# Patient Record
Sex: MALE | Race: WHITE | NOT HISPANIC OR LATINO | Employment: OTHER | URBAN - NONMETROPOLITAN AREA
[De-identification: names, ages, dates, MRNs, and addresses within clinical notes are randomized per-mention and may not be internally consistent; named-entity substitution may affect disease eponyms.]

---

## 2017-02-07 DIAGNOSIS — R03.0 ELEVATED BLOOD PRESSURE READING WITHOUT DIAGNOSIS OF HYPERTENSION: ICD-10-CM

## 2017-02-07 DIAGNOSIS — E78.00 PURE HYPERCHOLESTEROLEMIA: ICD-10-CM

## 2017-02-07 DIAGNOSIS — Z80.42 FAMILY HISTORY OF MALIGNANT NEOPLASM OF PROSTATE: ICD-10-CM

## 2017-02-07 DIAGNOSIS — R97.20 ELEVATED PROSTATE SPECIFIC ANTIGEN (PSA): ICD-10-CM

## 2017-02-07 LAB
ALBUMIN SERPL-MCNC: 4.4 G/DL (ref 3.4–5)
ALP SERPL-CCNC: 67 U/L (ref 40–150)
ALT SERPL W P-5'-P-CCNC: 26 U/L (ref 0–70)
ANION GAP SERPL CALCULATED.3IONS-SCNC: 9 MMOL/L (ref 3–14)
AST SERPL W P-5'-P-CCNC: 18 U/L (ref 0–45)
BASOPHILS # BLD AUTO: 0.1 10E9/L (ref 0–0.2)
BASOPHILS NFR BLD AUTO: 0.8 %
BILIRUB SERPL-MCNC: 1.1 MG/DL (ref 0.2–1.3)
BUN SERPL-MCNC: 17 MG/DL (ref 7–30)
CALCIUM SERPL-MCNC: 9.1 MG/DL (ref 8.5–10.1)
CHLORIDE SERPL-SCNC: 103 MMOL/L (ref 94–109)
CHOLEST SERPL-MCNC: 187 MG/DL
CO2 SERPL-SCNC: 26 MMOL/L (ref 20–32)
CREAT SERPL-MCNC: 0.89 MG/DL (ref 0.66–1.25)
DIFFERENTIAL METHOD BLD: NORMAL
EOSINOPHIL # BLD AUTO: 0.1 10E9/L (ref 0–0.7)
EOSINOPHIL NFR BLD AUTO: 0.9 %
ERYTHROCYTE [DISTWIDTH] IN BLOOD BY AUTOMATED COUNT: 12.9 % (ref 10–15)
GFR SERPL CREATININE-BSD FRML MDRD: 87 ML/MIN/1.7M2
GLUCOSE SERPL-MCNC: 86 MG/DL (ref 70–99)
HCT VFR BLD AUTO: 44.4 % (ref 40–53)
HDLC SERPL-MCNC: 76 MG/DL
HGB BLD-MCNC: 14.7 G/DL (ref 13.3–17.7)
IMM GRANULOCYTES # BLD: 0 10E9/L (ref 0–0.4)
IMM GRANULOCYTES NFR BLD: 0.5 %
LDLC SERPL CALC-MCNC: 100 MG/DL
LYMPHOCYTES # BLD AUTO: 1.3 10E9/L (ref 0.8–5.3)
LYMPHOCYTES NFR BLD AUTO: 20.2 %
MCH RBC QN AUTO: 32.4 PG (ref 26.5–33)
MCHC RBC AUTO-ENTMCNC: 33.1 G/DL (ref 31.5–36.5)
MCV RBC AUTO: 98 FL (ref 78–100)
MONOCYTES # BLD AUTO: 0.6 10E9/L (ref 0–1.3)
MONOCYTES NFR BLD AUTO: 8.9 %
NEUTROPHILS # BLD AUTO: 4.6 10E9/L (ref 1.6–8.3)
NEUTROPHILS NFR BLD AUTO: 68.7 %
NONHDLC SERPL-MCNC: 111 MG/DL
NRBC # BLD AUTO: 0 10*3/UL
NRBC BLD AUTO-RTO: 0 /100
PLATELET # BLD AUTO: 192 10E9/L (ref 150–450)
POTASSIUM SERPL-SCNC: 4.3 MMOL/L (ref 3.4–5.3)
PROT SERPL-MCNC: 8.1 G/DL (ref 6.8–8.8)
PSA SERPL-ACNC: 1.93 UG/L (ref 0–4)
RBC # BLD AUTO: 4.54 10E12/L (ref 4.4–5.9)
SODIUM SERPL-SCNC: 138 MMOL/L (ref 133–144)
TRIGL SERPL-MCNC: 57 MG/DL
WBC # BLD AUTO: 6.6 10E9/L (ref 4–11)

## 2017-02-07 PROCEDURE — 80061 LIPID PANEL: CPT | Performed by: FAMILY MEDICINE

## 2017-02-07 PROCEDURE — 84153 ASSAY OF PSA TOTAL: CPT | Performed by: FAMILY MEDICINE

## 2017-02-07 PROCEDURE — 80053 COMPREHEN METABOLIC PANEL: CPT | Performed by: FAMILY MEDICINE

## 2017-02-07 PROCEDURE — 85025 COMPLETE CBC W/AUTO DIFF WBC: CPT | Performed by: FAMILY MEDICINE

## 2017-02-07 PROCEDURE — 36415 COLL VENOUS BLD VENIPUNCTURE: CPT | Performed by: FAMILY MEDICINE

## 2017-02-09 ENCOUNTER — OFFICE VISIT (OUTPATIENT)
Dept: FAMILY MEDICINE | Facility: OTHER | Age: 62
End: 2017-02-09
Attending: FAMILY MEDICINE
Payer: COMMERCIAL

## 2017-02-09 VITALS
SYSTOLIC BLOOD PRESSURE: 120 MMHG | HEIGHT: 71 IN | DIASTOLIC BLOOD PRESSURE: 78 MMHG | HEART RATE: 88 BPM | BODY MASS INDEX: 25.48 KG/M2 | TEMPERATURE: 97.4 F | WEIGHT: 182 LBS

## 2017-02-09 DIAGNOSIS — D23.9 ATYPICAL MOLE SYNDROME: ICD-10-CM

## 2017-02-09 DIAGNOSIS — Z71.89 ACP (ADVANCE CARE PLANNING): Primary | Chronic | ICD-10-CM

## 2017-02-09 DIAGNOSIS — R97.20 ELEVATED PROSTATE SPECIFIC ANTIGEN (PSA): ICD-10-CM

## 2017-02-09 DIAGNOSIS — F41.8 SITUATIONAL ANXIETY: ICD-10-CM

## 2017-02-09 DIAGNOSIS — Z00.00 ROUTINE GENERAL MEDICAL EXAMINATION AT A HEALTH CARE FACILITY: ICD-10-CM

## 2017-02-09 DIAGNOSIS — N52.8 OTHER MALE ERECTILE DYSFUNCTION: ICD-10-CM

## 2017-02-09 PROCEDURE — 99396 PREV VISIT EST AGE 40-64: CPT | Performed by: FAMILY MEDICINE

## 2017-02-09 RX ORDER — SILDENAFIL CITRATE 20 MG/1
TABLET ORAL
Qty: 60 TABLET | Refills: 3 | Status: SHIPPED | OUTPATIENT
Start: 2017-02-09 | End: 2017-02-14

## 2017-02-09 RX ORDER — LORAZEPAM 1 MG/1
1 TABLET ORAL EVERY 8 HOURS PRN
Qty: 20 TABLET | Refills: 0 | Status: SHIPPED | OUTPATIENT
Start: 2017-02-09 | End: 2018-02-07

## 2017-02-09 ASSESSMENT — ANXIETY QUESTIONNAIRES
6. BECOMING EASILY ANNOYED OR IRRITABLE: NOT AT ALL
GAD7 TOTAL SCORE: 2
3. WORRYING TOO MUCH ABOUT DIFFERENT THINGS: SEVERAL DAYS
1. FEELING NERVOUS, ANXIOUS, OR ON EDGE: SEVERAL DAYS
5. BEING SO RESTLESS THAT IT IS HARD TO SIT STILL: NOT AT ALL
7. FEELING AFRAID AS IF SOMETHING AWFUL MIGHT HAPPEN: NOT AT ALL
IF YOU CHECKED OFF ANY PROBLEMS ON THIS QUESTIONNAIRE, HOW DIFFICULT HAVE THESE PROBLEMS MADE IT FOR YOU TO DO YOUR WORK, TAKE CARE OF THINGS AT HOME, OR GET ALONG WITH OTHER PEOPLE: NOT DIFFICULT AT ALL
2. NOT BEING ABLE TO STOP OR CONTROL WORRYING: NOT AT ALL

## 2017-02-09 ASSESSMENT — PAIN SCALES - GENERAL: PAINLEVEL: NO PAIN (0)

## 2017-02-09 ASSESSMENT — PATIENT HEALTH QUESTIONNAIRE - PHQ9: 5. POOR APPETITE OR OVEREATING: NOT AT ALL

## 2017-02-09 NOTE — PROGRESS NOTES
Zeus is a 61 year old   Chief Complaint   Patient presents with     Physical       PMHx:  Past Medical History   Diagnosis Date     Impaired fasting glucose 2/7/2011     hypercholesterolemia 2/7/2011     Family history of colonic polyps 2/7/2011     Family history of malignant neoplasm of prostate 2/7/2011     Elevated BP/Pulse -white coat syndrome. 2/6/2012     Other male erectile dysfunction 2/8/2016     Past Surgical History   Procedure Laterality Date     Hernia repair  1970     Hernia, inguinal right     Colonoscopy  2005     repeat 2015     Colonoscopy N/A 4/9/2015     Repeat in 2025//Procedure: COLONOSCOPY;  Surgeon: Shan Nice MD;  Location: [facility]     These were reviewed with the patient/family.    MEDICATIONS were reviewed and are as follows:   Current Outpatient Prescriptions   Medication Sig Dispense Refill     LORazepam (ATIVAN) 1 MG tablet Take 1 tablet (1 mg) by mouth every 8 hours as needed for anxiety 20 tablet 0     sildenafil (VIAGRA) 100 MG tablet Take 0.5-1 tablets ( mg) by mouth daily as needed for erectile dysfunction Take 30 min to 4 hours before intercourse.  Never use with nitroglycerin, terazosin or doxazosin. 6 tablet 11     ammonium lactate (LAC-HYDRIN) 12 % lotion Apply topically 2 times daily as needed for dry skin 360 g 3     terbinafine (LAMISIL AT) 1 % cream Apply topically 2 times daily For fungal infection not resolved with other antifungals (e.g. Clotrimazole) 30 g 1     EPINEPHrine (EPIPEN) 0.3 MG/0.3ML injection Inject 0.3 mg into the muscle once as needed. For anaphylaxis         ALLERGIES:  Bactrim    Family History   Problem Relation Age of Onset     CANCER Father      pancreatic      Social History   Substance Use Topics     Smoking status: Never Smoker      Smokeless tobacco: Never Used     Alcohol Use: Yes      Comment: rarely        Review Of Systems  Skin: negative for, rash, lumps or bumps, hair changes  Eyes: negative for any visual  "changes  Ears/Nose/Throat: negative for, nasal congestion, postnasal drainage, hearing loss, vertigo  Respiratory: No shortness of breath, dyspnea on exertion, cough, or hemoptysis  Cardiovascular: negative for, palpitations, tachycardia, chest pain, exertional chest pain or pressure, dyspnea on exertion, lower extremity edema and exercise intolerance  Gastrointestinal: negative for abdominal pain, change in bowel habits, blood in stools.  Genitourinary: negative for dysuria, hematuria, incontinence and sexually transmitted disease. Does have some nocturia  Musculoskeletal: negative for, joint pain, joint swelling and muscular weakness  Neurologic: negative for, headaches, syncope, local weakness, numbness or tingling of hands, numbness or tingling of feet, speech problems and memory problems  Psychiatric: negative for, excessive stress, sleep disturbance, anxiety, depression and excessive alcohol consumption  Hematologic/Lymphatic/Immunologic: negative for, chills, fever and night sweats  Endocrine: negative for, heat intolerance, night sweats, polydipsia and polyuria    IMMUNIZATIONS reviewed.      Constitutional: healthy, alert and no distress  Blood pressure 120/78, pulse 88, temperature 97.4  F (36.3  C), height 5' 10.5\" (1.791 m), weight 182 lb (82.555 kg).  Head: Normocephalic. No masses, lesions, tenderness or abnormalities  Neck: Neck supple. No adenopathy. Thyroid symmetric, normal size,, Carotids without bruits.  ENT: ENT exam normal, no neck nodes or sinus tenderness  Cardiovascular: negative, PMI normal. No lifts, heaves, or thrills. RRR. No murmurs, clicks gallops or rub  Respiratory: negative, Percussion normal. Good diaphragmatic excursion. Lungs clear  breasts symmetric  Gastrointestinal: Abdomen soft, non-tender. BS normal. No masses, organomegaly  : Normal external genitalia without lesions. No hernias. Prostate exam normal for age  Musculoskeletal: extremities normal- no gross deformities " "noted, gait normal and normal muscle tone  Skin: no suspicious lesions or rashes  Neurologic: negative  Psychiatric: mentation appears normal and affect normal/bright  Hematologic/Lymphatic/Immunologic: normal ant/post cervical, axillary, supraclavicular and inguinal nodes       Ref Range 2d ago  (2/7/17) 1yr ago  (2/8/16) 1yr ago  (2/11/15) 3yr ago  (2/10/14)       Cholesterol <200 mg/dL 187 174 180CM 182R, CM      Triglycerides <150 mg/dL 57 46CM 69R 27R     Comments: Fasting specimen      HDL Cholesterol >39 mg/dL 76 39 mg/dL\" class=\"rz_14\" style=\"cursor: pointer;\" onmouseover='jscript: deepika varStyle=\"underline\"; deepika bgColor=\"#D6DFE7\"; this.style.backgroundColor=bgColor; deepika children=this.getElementsByTagName(\"div\"); for(deepika child=0;child  56 40 mg/dL\" class=\"rz_14\" style=\"cursor: pointer;\" onmouseover='jscript: deepika varStyle=\"underline\"; deepika bgColor=\"#D6DFE7\"; this.style.backgroundColor=bgColor; deepika children=this.getElementsByTagName(\"div\"); for(deepika child=0;child  56R 57R      LDL Cholesterol Calculated <100 mg/dL 100 (H) 109 (H)CM 110R, CM 120R, CM     Comments: Desirable:       <100 mg/dl      Non HDL Cholesterol <130 mg/dL 111 118       [facility]          Specimen Collected: 02/07/17 11:05 AM Last Resulted: 02/07/17 11:38 AM                CM=Additional comments  R=Reference range differs from displayed range                 Comprehensive metabolic panel   Status: Final result     Visible to patient:  Not Released     Dx:  hypercholesterolemia; Elevated BP/Pul...                     Ref Range 2d ago  (2/7/17) 1yr ago  (2/8/16) 1yr ago  (2/11/15) 3yr ago  (2/10/14)      Sodium 133 - 144 mmol/L 138 140 139 134 (L)R      Potassium 3.4 - 5.3 mmol/L 4.3 4.1 4.0 4.2R      Chloride 94 - 109 mmol/L 103 107 107 101R      Carbon Dioxide 20 - 32 mmol/L 26 23 25 27R      Anion Gap 3 - 14 mmol/L 9 10 7 6.5R      Glucose 70 - 99 mg/dL 86 96 111 (H)CM 99R     Comments: Fasting specimen      Urea Nitrogen 7 " "- 30 mg/dL 17 18 17CM 19R      Creatinine 0.66 - 1.25 mg/dL 0.89 0.78 0.90 0.94R      GFR Estimate >60 mL/min/1.7m2 87 60 mL/min/1.7m2\" class=\"rz_14\" style=\"cursor: pointer;\" onmouseover='jscript: deepika varStyle=\"underline\"; deepika bgColor=\"#D6DFE7\"; this.style.backgroundColor=bgColor; deepika children=this.getElementsByTagName(\"div\"); for(deepika child=0;child  >90  Non  Amer... 60 mL/min/1.7m2\" class=\"rz_14\" style=\"cursor: pointer;\" onmouseover='jscript: deepika varStyle=\"underline\"; deepika bgColor=\"#D6DFE7\"; this.style.backgroundColor=bgColor; deepika children=this.getElementsByTagName(\"div\"); for(deepika child=0;child  86CM 60 mL/min/1.7m2\" class=\"rz_15\" style=\"cursor: pointer;\" onmouseover='jscript: deepika varStyle=\"underline\"; deepika bgColor=\"#D6DFE7\"; this.style.backgroundColor=bgColor; deepika children=this.getElementsByTagName(\"div\"); for(deepika child=0;child  82     Comments: Non  GFR Calc      GFR Estimate If Black >60 mL/min/1.7m2 >90  ... 60 mL/min/1.7m2\" class=\"rz_14\" style=\"cursor: pointer;\" onmouseover='jscript: deepika varStyle=\"underline\"; deepika bgColor=\"#D6DFE7\"; this.style.backgroundColor=bgColor; deepika children=this.getElementsByTagName(\"div\"); for(deepika child=0;child  >90  ... 60 mL/min/1.7m2\" class=\"rz_14\" style=\"cursor: pointer;\" onmouseover='jscript: deepika varStyle=\"underline\"; deepika bgColor=\"#D6DFE7\"; this.style.backgroundColor=bgColor; deepika children=this.getElementsByTagName(\"div\"); for(deepika child=0;child  >90  ... 60 mL/min/1.7m2\" class=\"rz_15\" style=\"cursor: pointer;\" onmouseover='jscript: deepika varStyle=\"underline\"; deepika bgColor=\"#D6DFE7\"; this.style.backgroundColor=bgColor; deepika children=this.getElementsByTagName(\"div\"); for(deepika child=0;child  >90     >90   African American GFR Calc         Calcium 8.5 - 10.1 mg/dL 9.1 8.6 9.2CM 9.0R      Bilirubin Total 0.2 - 1.3 mg/dL 1.1 1.0 1.1 1.1 (H)R      Albumin 3.4 - 5.0 g/dL 4.4 4.3 4.3 4.5      Protein Total 6.8 - 8.8 g/dL 8.1 7.7 " 7.9 8.2R      Alkaline Phosphatase 40 - 150 U/L 67 58 61 84R      ALT 0 - 70 U/L 26 30 27 29R      AST 0 - 45 U/L 18 22 21 19R     Resulting Agency  [facility]          Specimen Collected: 02/07/17 11:05 AM Last Resulted: 02/07/17 11:38 AM                CM=Additional comments  R=Reference range differs from displayed range                 PSA, screen   Status: Final result     Visible to patient:  Not Released     Dx:  Elevated prostate specific antigen (P...                     Ref Range 2d ago  (2/7/17) 1yr ago  (2/8/16) 1yr ago  (5/14/15) 1yr ago  (2/11/15) 3yr ago  (2/10/14)      PSA 0 - 4 ug/L 1.93 1.41 1.49 3.82 1.94     Comments: Assay Method:  Chemiluminescence using Siemens Vista analyzer     Resulting Agency  [facility]          Specimen Collected: 02/07/17 11:05 AM Last Resulted: 02/07/17 11:38 AM                            CBC with platelets and differential   Status: Final result     Visible to patient:  Not Released     Dx:  hypercholesterolemia; Elevated BP/Pul...                     Ref Range 2d ago  (2/7/17) 1yr ago  (2/8/16) 1yr ago  (2/11/15) 3yr ago  (2/10/14)      WBC 4.0 - 11.0 10e9/L 6.6 3.3 (L) 4.0 4.9      RBC Count 4.4 - 5.9 10e12/L 4.54 4.24 (L) 4.49 4.60      Hemoglobin 13.3 - 17.7 g/dL 14.7 14.0 14.6 14.8      Hematocrit 40.0 - 53.0 % 44.4 42.4 44.2 44.8      MCV 78 - 100 fl 98 100 98 97      MCH 26.5 - 33.0 pg 32.4 33.0 32.5 32.2      MCHC 31.5 - 36.5 g/dL 33.1 33.0 33.0 33.0      RDW 10.0 - 15.0 % 12.9 12.5 12.8 13.1      Platelet Count 150 - 450 10e9/L 192 204 216 189      Diff Method  Automated Method Automated Method Automated Method Automated Method      % Neutrophils % 68.7 51.8 50.5 62.6      % Lymphocytes % 20.2 34.4 35.9 25.7      % Monocytes % 8.9 12.0 11.3 10.9      % Eosinophils % 0.9 0.3 1.0 0.2      % Basophils % 0.8 1.2 1.0 0.4      % Immature Granulocytes % 0.5 0.3 0.3 0.2      Nucleated RBCs 0 /100 0 0        Absolute Neutrophil 1.6 -  8.3 10e9/L 4.6 1.7 2.0 3.0      Absolute Lymphocytes 0.8 - 5.3 10e9/L 1.3 1.1 1.4 1.3      Absolute Monocytes 0.0 - 1.3 10e9/L 0.6 0.4 0.5 0.5      Absolute Eosinophils 0.0 - 0.7 10e9/L 0.1 0.0 0.0 0.0      Absolute Basophils 0.0 - 0.2 10e9/L 0.1 0.0 0.0 0.0      Abs Immature Granulocytes 0 - 0.4 10e9/L 0.0 0.0 0.0 0.0      Absolute Nucleated RBC  0.0 0.0       [facility]          Specimen Collected: 02/07/17 11:05 AM Last Resulted: 02/07/17 11:17 AM                                    ASSESSMENT:  (Z71.89) ACP (advance care planning)  (primary encounter diagnosis)  Comment:   Plan: info given    (Z00.00) Routine general medical examination at a health care facility  Comment: very healthy habits.   Plan: see in a year     (R97.20) Elevated prostate specific antigen (PSA)  Comment: better recently   Plan: f/u in a year     (F41.8) Situational anxiety  Comment: Rf on ativan - uses 20 /yr  Plan: LORazepam (ATIVAN) 1 MG tablet            (N52.8) Other male erectile dysfunction  Comment: wants Generic- will give RF  Plan: sildenafil (REVATIO/VIAGRA) 20 MG tablet            (D22.9) Atypical mole syndrome  Comment: has had some moles removed in past- needs yearly f/u with derm.   Plan: DERMATOLOGY REFERRAL

## 2017-02-09 NOTE — MR AVS SNAPSHOT
"              After Visit Summary   2/9/2017    Zeus Ford    MRN: [id number]           Patient Information     Date Of Birth          1955        Visit Information        Provider Department      2/9/2017 10:00 AM Cirilo Landeros MD [facility]        Today's Diagnoses     ACP (advance care planning)    -  1     Routine general medical examination at a health care facility         Elevated prostate specific antigen (PSA)         Situational anxiety         Other male erectile dysfunction         Atypical mole syndrome           Care Instructions       Ref Range 2d ago  (2/7/17) 1yr ago  (2/8/16) 1yr ago  (2/11/15) 3yr ago  (2/10/14)       Cholesterol <200 mg/dL 187 174 180CM 182R, CM      Triglycerides <150 mg/dL 57 46CM 69R 27R     Comments: Fasting specimen      HDL Cholesterol >39 mg/dL 76 39 mg/dL\" class=\"rz_14\" style=\"cursor: pointer;\" onmouseover='jscript: deepika varStyle=\"underline\"; deepika bgColor=\"#D6DFE7\"; this.style.backgroundColor=bgColor; deepika children=this.getElementsByTagName(\"div\"); for(deepika child=0;child  56 40 mg/dL\" class=\"rz_14\" style=\"cursor: pointer;\" onmouseover='jscript: deepika varStyle=\"underline\"; deepika bgColor=\"#D6DFE7\"; this.style.backgroundColor=bgColor; deepika children=this.getElementsByTagName(\"div\"); for(deepika child=0;child  56R 57R      LDL Cholesterol Calculated <100 mg/dL 100 (H) 109 (H)CM 110R, CM 120R, CM     Comments: Desirable:       <100 mg/dl      Non HDL Cholesterol <130 mg/dL 111 118       [facility]          Specimen Collected: 02/07/17 11:05 AM Last Resulted: 02/07/17 11:38 AM                CM=Additional comments  R=Reference range differs from displayed range                 Comprehensive metabolic panel   Status: Final result     Visible to patient:  Not Released     Dx:  hypercholesterolemia; Elevated BP/Pul...                     Ref Range 2d ago  (2/7/17) 1yr ago  (2/8/16) 1yr ago  (2/11/15) 3yr ago  (2/10/14)      Sodium 133 - 144 mmol/L " "138 140 139 134 (L)R      Potassium 3.4 - 5.3 mmol/L 4.3 4.1 4.0 4.2R      Chloride 94 - 109 mmol/L 103 107 107 101R      Carbon Dioxide 20 - 32 mmol/L 26 23 25 27R      Anion Gap 3 - 14 mmol/L 9 10 7 6.5R      Glucose 70 - 99 mg/dL 86 96 111 (H)CM 99R     Comments: Fasting specimen      Urea Nitrogen 7 - 30 mg/dL 17 18 17CM 19R      Creatinine 0.66 - 1.25 mg/dL 0.89 0.78 0.90 0.94R      GFR Estimate >60 mL/min/1.7m2 87 60 mL/min/1.7m2\" class=\"rz_14\" style=\"cursor: pointer;\" onmouseover='jscript: deepika varStyle=\"underline\"; deepika bgColor=\"#D6DFE7\"; this.style.backgroundColor=bgColor; deepika children=this.getElementsByTagName(\"div\"); for(deepika child=0;child  >90  Non  Amer... 60 mL/min/1.7m2\" class=\"rz_14\" style=\"cursor: pointer;\" onmouseover='jscript: deepika varStyle=\"underline\"; deepika bgColor=\"#D6DFE7\"; this.style.backgroundColor=bgColor; deepika children=this.getElementsByTagName(\"div\"); for(deepika child=0;child  86CM 60 mL/min/1.7m2\" class=\"rz_15\" style=\"cursor: pointer;\" onmouseover='jscript: deepika varStyle=\"underline\"; deepika bgColor=\"#D6DFE7\"; this.style.backgroundColor=bgColor; deepika children=this.getElementsByTagName(\"div\"); for(deepika child=0;child  82     Comments: Non  GFR Calc      GFR Estimate If Black >60 mL/min/1.7m2 >90  ... 60 mL/min/1.7m2\" class=\"rz_14\" style=\"cursor: pointer;\" onmouseover='jscript: deepika varStyle=\"underline\"; deepika bgColor=\"#D6DFE7\"; this.style.backgroundColor=bgColor; deepika children=this.getElementsByTagName(\"div\"); for(deepika child=0;child  >90  ... 60 mL/min/1.7m2\" class=\"rz_14\" style=\"cursor: pointer;\" onmouseover='jscript: deepika varStyle=\"underline\"; deepika bgColor=\"#D6DFE7\"; this.style.backgroundColor=bgColor; deepika children=this.getElementsByTagName(\"div\"); for(deepika child=0;child  >90  ... 60 mL/min/1.7m2\" class=\"rz_15\" style=\"cursor: pointer;\" onmouseover='jscript: deepika varStyle=\"underline\"; deepika bgColor=\"#D6DFE7\"; this.style.backgroundColor=bgColor; " "deepika children=this.getElementsByTagName(\"div\"); for(deepika child=0;child  >90     >90   African American GFR Calc         Calcium 8.5 - 10.1 mg/dL 9.1 8.6 9.2CM 9.0R      Bilirubin Total 0.2 - 1.3 mg/dL 1.1 1.0 1.1 1.1 (H)R      Albumin 3.4 - 5.0 g/dL 4.4 4.3 4.3 4.5      Protein Total 6.8 - 8.8 g/dL 8.1 7.7 7.9 8.2R      Alkaline Phosphatase 40 - 150 U/L 67 58 61 84R      ALT 0 - 70 U/L 26 30 27 29R      AST 0 - 45 U/L 18 22 21 19R     Resulting Agency  [facility]          Specimen Collected: 02/07/17 11:05 AM Last Resulted: 02/07/17 11:38 AM                CM=Additional comments  R=Reference range differs from displayed range                 PSA, screen   Status: Final result     Visible to patient:  Not Released     Dx:  Elevated prostate specific antigen (P...                     Ref Range 2d ago  (2/7/17) 1yr ago  (2/8/16) 1yr ago  (5/14/15) 1yr ago  (2/11/15) 3yr ago  (2/10/14)      PSA 0 - 4 ug/L 1.93 1.41 1.49 3.82 1.94     Comments: Assay Method:  Chemiluminescence using Siemens Vista analyzer     Resulting Agency  [facility]          Specimen Collected: 02/07/17 11:05 AM Last Resulted: 02/07/17 11:38 AM                            CBC with platelets and differential   Status: Final result     Visible to patient:  Not Released     Dx:  hypercholesterolemia; Elevated BP/Pul...                     Ref Range 2d ago  (2/7/17) 1yr ago  (2/8/16) 1yr ago  (2/11/15) 3yr ago  (2/10/14)      WBC 4.0 - 11.0 10e9/L 6.6 3.3 (L) 4.0 4.9      RBC Count 4.4 - 5.9 10e12/L 4.54 4.24 (L) 4.49 4.60      Hemoglobin 13.3 - 17.7 g/dL 14.7 14.0 14.6 14.8      Hematocrit 40.0 - 53.0 % 44.4 42.4 44.2 44.8      MCV 78 - 100 fl 98 100 98 97      MCH 26.5 - 33.0 pg 32.4 33.0 32.5 32.2      MCHC 31.5 - 36.5 g/dL 33.1 33.0 33.0 33.0      RDW 10.0 - 15.0 % 12.9 12.5 12.8 13.1      Platelet Count 150 - 450 10e9/L 192 204 216 189      Diff Method  Automated Method Automated Method Automated Method Automated Method "      % Neutrophils % 68.7 51.8 50.5 62.6      % Lymphocytes % 20.2 34.4 35.9 25.7      % Monocytes % 8.9 12.0 11.3 10.9      % Eosinophils % 0.9 0.3 1.0 0.2      % Basophils % 0.8 1.2 1.0 0.4      % Immature Granulocytes % 0.5 0.3 0.3 0.2      Nucleated RBCs 0 /100 0 0        Absolute Neutrophil 1.6 - 8.3 10e9/L 4.6 1.7 2.0 3.0      Absolute Lymphocytes 0.8 - 5.3 10e9/L 1.3 1.1 1.4 1.3      Absolute Monocytes 0.0 - 1.3 10e9/L 0.6 0.4 0.5 0.5      Absolute Eosinophils 0.0 - 0.7 10e9/L 0.1 0.0 0.0 0.0      Absolute Basophils 0.0 - 0.2 10e9/L 0.1 0.0 0.0 0.0      Abs Immature Granulocytes 0 - 0.4 10e9/L 0.0 0.0 0.0 0.0      Absolute Nucleated RBC  0.0 0.0       [facility]          Specimen Collected: 02/07/17 11:05 AM Last Resulted: 02/07/17 11:17 AM                                        Follow-ups after your visit        Additional Services     DERMATOLOGY REFERRAL       Your provider has referred you to: [facility]     Please be aware that coverage of these services is subject to the terms and limitations of your health insurance plan.  Call member services at your health plan with any benefit or coverage questions.      Please bring the following with you to your appointment:    (1) Any X-Rays, CTs or MRIs which have been performed.  Contact the facility where they were done to arrange for  prior to your scheduled appointment.    (2) List of current medications  (3) This referral request   (4) Any documents/labs given to you for this referral                  Who to contact     If you have questions or need follow up information about today's clinic visit or your schedule please contact [facility] [place] directly at [phone].  Normal or non-critical lab and imaging results will be communicated to you by MyChart, letter or phone within 4 business days after the clinic has received the results. If you do not hear from us within 7 days, please contact the clinic through [employer] or  "phone. If you have a critical or abnormal lab result, we will notify you by phone as soon as possible.  Submit refill requests through [employer] or call your pharmacy and they will forward the refill request to us. Please allow 3 business days for your refill to be completed.          Additional Information About Your Visit        [employer] Information     [employer] lets you send messages to your doctor, view your test results, renew your prescriptions, schedule appointments and more. To sign up, go to [email or web address] . Click on \"Log in\" on the left side of the screen, which will take you to the Welcome page. Then click on \"Sign up Now\" on the right side of the page.     You will be asked to enter the access code listed below, as well as some personal information. Please follow the directions to create your username and password.     Your access code is: [email or web address]  Expires: 2017  7:00 AM     Your access code will  in 90 days. If you need help or a new code, please call your [facility] clinic or [phone].        Care EveryWhere ID     This is your Care EveryWhere ID. This could be used by other organizations to access your [facility] medical records  [id number]        Your Vitals Were     Pulse Temperature Height BMI (Body Mass Index)          88 97.4  F (36.3  C) 5' 10.5\" (1.791 m) 25.74 kg/m2         Blood Pressure from Last 3 Encounters:   17 120/78   16 168/98   16 162/92    Weight from Last 3 Encounters:   17 182 lb (82.555 kg)   16 187 lb (84.823 kg)   16 187 lb (84.823 kg)              We Performed the Following     DERMATOLOGY REFERRAL          Today's Medication Changes          These changes are accurate as of: 17 10:42 AM.  If you have any questions, ask your nurse or doctor.               Start taking these medicines.        Dose/Directions    sildenafil 20 MG tablet   Commonly known as:  REVATIO/VIAGRA   Used for:  Other male erectile dysfunction "   Started by:  Cirilo Landeros MD        Take 1 tablet  To 5 tabs as directed to take 1 hour before intercourse.   Quantity:  60 tablet   Refills:  3         Stop taking these medicines if you haven't already. Please contact your care team if you have questions.     sildenafil 100 MG cap/tab   Commonly known as:  VIAGRA   Stopped by:  Cirilo Landeros MD                Where to get your medicines      These medications were sent to [id number] [place] - [address]   [id number] , [place] [address]     Phone:  [phone]    - sildenafil 20 MG tablet      Some of these will need a paper prescription and others can be bought over the counter.  Ask your nurse if you have questions.     Bring a paper prescription for each of these medications    - LORazepam 1 MG tablet             Primary Care Provider Office Phone # Fax #    Cirilo Landeros -[phone]       [facility] [address]  [place] [address]        Thank you!     Thank you for choosing [facility]  for your care. Our goal is always to provide you with excellent care. Hearing back from our patients is one way we can continue to improve our services. Please take a few minutes to complete the written survey that you may receive in the mail after your visit with us. Thank you!             Your Updated Medication List - Protect others around you: Learn how to safely use, store and throw away your medicines at www.disposemymeds.org.          This list is accurate as of: 2/9/17 10:42 AM.  Always use your most recent med list.                   Brand Name Dispense Instructions for use    ammonium lactate 12 % lotion    LAC-HYDRIN    360 g    Apply topically 2 times daily as needed for dry skin       EPIPEN 0.3 MG/0.3ML injection   Generic drug:  EPINEPHrine      Inject 0.3 mg into the muscle once as needed. For anaphylaxis       LORazepam 1 MG tablet    ATIVAN    20 tablet    Take 1 tablet (1 mg) by  mouth every 8 hours as needed for anxiety       sildenafil 20 MG tablet    REVATIO/VIAGRA    60 tablet    Take 1 tablet  To 5 tabs as directed to take 1 hour before intercourse.       terbinafine 1 % cream    lamISIL AT    30 g    Apply topically 2 times daily For fungal infection not resolved with other antifungals (e.g. Clotrimazole)

## 2017-02-09 NOTE — PATIENT INSTRUCTIONS
"   Ref Range 2d ago  (2/7/17) 1yr ago  (2/8/16) 1yr ago  (2/11/15) 3yr ago  (2/10/14)       Cholesterol <200 mg/dL 187 174 180CM 182R, CM      Triglycerides <150 mg/dL 57 46CM 69R 27R     Comments: Fasting specimen      HDL Cholesterol >39 mg/dL 76 39 mg/dL\" class=\"rz_14\" style=\"cursor: pointer;\" onmouseover='jscript: deepika varStyle=\"underline\"; deepika bgColor=\"#D6DFE7\"; this.style.backgroundColor=bgColor; deepika children=this.getElementsByTagName(\"div\"); for(deepika child=0;child  56 40 mg/dL\" class=\"rz_14\" style=\"cursor: pointer;\" onmouseover='jscript: deepika varStyle=\"underline\"; deepika bgColor=\"#D6DFE7\"; this.style.backgroundColor=bgColor; deepika children=this.getElementsByTagName(\"div\"); for(deepika child=0;child  56R 57R      LDL Cholesterol Calculated <100 mg/dL 100 (H) 109 (H)CM 110R, CM 120R, CM     Comments: Desirable:       <100 mg/dl      Non HDL Cholesterol <130 mg/dL 111 118       Resulting Agency  HI HI HI HI          Specimen Collected: 02/07/17 11:05 AM Last Resulted: 02/07/17 11:38 AM                CM=Additional comments  R=Reference range differs from displayed range                 Comprehensive metabolic panel   Status: Final result     Visible to patient:  Not Released     Dx:  hypercholesterolemia; Elevated BP/Pul...                     Ref Range 2d ago  (2/7/17) 1yr ago  (2/8/16) 1yr ago  (2/11/15) 3yr ago  (2/10/14)      Sodium 133 - 144 mmol/L 138 140 139 134 (L)R      Potassium 3.4 - 5.3 mmol/L 4.3 4.1 4.0 4.2R      Chloride 94 - 109 mmol/L 103 107 107 101R      Carbon Dioxide 20 - 32 mmol/L 26 23 25 27R      Anion Gap 3 - 14 mmol/L 9 10 7 6.5R      Glucose 70 - 99 mg/dL 86 96 111 (H)CM 99R     Comments: Fasting specimen      Urea Nitrogen 7 - 30 mg/dL 17 18 17CM 19R      Creatinine 0.66 - 1.25 mg/dL 0.89 0.78 0.90 0.94R      GFR Estimate >60 mL/min/1.7m2 87 60 mL/min/1.7m2\" class=\"rz_14\" style=\"cursor: pointer;\" onmouseover='jscript: deepika varStyle=\"underline\"; deepika bgColor=\"#D6DFE7\"; " "this.style.backgroundColor=bgColor; deepika children=this.getElementsByTagName(\"div\"); for(deepika child=0;child  >90  Non  Amer... 60 mL/min/1.7m2\" class=\"rz_14\" style=\"cursor: pointer;\" onmouseover='jscript: deepika varStyle=\"underline\"; depeika bgColor=\"#D6DFE7\"; this.style.backgroundColor=bgColor; deepika children=this.getElementsByTagName(\"div\"); for(deepika child=0;child  86CM 60 mL/min/1.7m2\" class=\"rz_15\" style=\"cursor: pointer;\" onmouseover='jscript: deepika varStyle=\"underline\"; deepika bgColor=\"#D6DFE7\"; this.style.backgroundColor=bgColor; deepika children=this.getElementsByTagName(\"div\"); for(deepika child=0;child  82     Comments: Non  GFR Calc      GFR Estimate If Black >60 mL/min/1.7m2 >90  ... 60 mL/min/1.7m2\" class=\"rz_14\" style=\"cursor: pointer;\" onmouseover='jscript: deepika varStyle=\"underline\"; deepika bgColor=\"#D6DFE7\"; this.style.backgroundColor=bgColor; deepika children=this.getElementsByTagName(\"div\"); for(deepika child=0;child  >90  ... 60 mL/min/1.7m2\" class=\"rz_14\" style=\"cursor: pointer;\" onmouseover='jscript: deepika varStyle=\"underline\"; deepika bgColor=\"#D6DFE7\"; this.style.backgroundColor=bgColor; deepika children=this.getElementsByTagName(\"div\"); for(deepika child=0;child  >90  ... 60 mL/min/1.7m2\" class=\"rz_15\" style=\"cursor: pointer;\" onmouseover='jscript: deepika varStyle=\"underline\"; edepika bgColor=\"#D6DFE7\"; this.style.backgroundColor=bgColor; deepika children=this.getElementsByTagName(\"div\"); for(deepika child=0;child  >90     >90   African American GFR Calc         Calcium 8.5 - 10.1 mg/dL 9.1 8.6 9.2CM 9.0R      Bilirubin Total 0.2 - 1.3 mg/dL 1.1 1.0 1.1 1.1 (H)R      Albumin 3.4 - 5.0 g/dL 4.4 4.3 4.3 4.5      Protein Total 6.8 - 8.8 g/dL 8.1 7.7 7.9 8.2R      Alkaline Phosphatase 40 - 150 U/L 67 58 61 84R      ALT 0 - 70 U/L 26 30 27 29R      AST 0 - 45 U/L 18 22 21 19R     Resulting Agency  HI HI HI HI          Specimen Collected: 02/07/17 11:05 AM Last Resulted: 02/07/17 11:38 AM       "          CM=Additional comments  R=Reference range differs from displayed range                 PSA, screen   Status: Final result     Visible to patient:  Not Released     Dx:  Elevated prostate specific antigen (P...                     Ref Range 2d ago  (2/7/17) 1yr ago  (2/8/16) 1yr ago  (5/14/15) 1yr ago  (2/11/15) 3yr ago  (2/10/14)      PSA 0 - 4 ug/L 1.93 1.41 1.49 3.82 1.94     Comments: Assay Method:  Chemiluminescence using Siemens Vista analyzer     Resulting Agency  HI HI HI [facility]          Specimen Collected: 02/07/17 11:05 AM Last Resulted: 02/07/17 11:38 AM                            CBC with platelets and differential   Status: Final result     Visible to patient:  Not Released     Dx:  hypercholesterolemia; Elevated BP/Pul...                     Ref Range 2d ago  (2/7/17) 1yr ago  (2/8/16) 1yr ago  (2/11/15) 3yr ago  (2/10/14)      WBC 4.0 - 11.0 10e9/L 6.6 3.3 (L) 4.0 4.9      RBC Count 4.4 - 5.9 10e12/L 4.54 4.24 (L) 4.49 4.60      Hemoglobin 13.3 - 17.7 g/dL 14.7 14.0 14.6 14.8      Hematocrit 40.0 - 53.0 % 44.4 42.4 44.2 44.8      MCV 78 - 100 fl 98 100 98 97      MCH 26.5 - 33.0 pg 32.4 33.0 32.5 32.2      MCHC 31.5 - 36.5 g/dL 33.1 33.0 33.0 33.0      RDW 10.0 - 15.0 % 12.9 12.5 12.8 13.1      Platelet Count 150 - 450 10e9/L 192 204 216 189      Diff Method  Automated Method Automated Method Automated Method Automated Method      % Neutrophils % 68.7 51.8 50.5 62.6      % Lymphocytes % 20.2 34.4 35.9 25.7      % Monocytes % 8.9 12.0 11.3 10.9      % Eosinophils % 0.9 0.3 1.0 0.2      % Basophils % 0.8 1.2 1.0 0.4      % Immature Granulocytes % 0.5 0.3 0.3 0.2      Nucleated RBCs 0 /100 0 0        Absolute Neutrophil 1.6 - 8.3 10e9/L 4.6 1.7 2.0 3.0      Absolute Lymphocytes 0.8 - 5.3 10e9/L 1.3 1.1 1.4 1.3      Absolute Monocytes 0.0 - 1.3 10e9/L 0.6 0.4 0.5 0.5      Absolute Eosinophils 0.0 - 0.7 10e9/L 0.1 0.0 0.0 0.0      Absolute Basophils 0.0 - 0.2 10e9/L 0.1  0.0 0.0 0.0      Abs Immature Granulocytes 0 - 0.4 10e9/L 0.0 0.0 0.0 0.0      Absolute Nucleated RBC  0.0 0.0       Resulting Agency  HI HI HI HI          Specimen Collected: 02/07/17 11:05 AM Last Resulted: 02/07/17 11:17 AM

## 2017-02-09 NOTE — NURSING NOTE
"Chief Complaint   Patient presents with     Physical       Initial /62 mmHg  Pulse 88  Temp(Src) 97.4  F (36.3  C)  Ht 5' 10.5\" (1.791 m)  Wt 182 lb (82.555 kg)  BMI 25.74 kg/m2 Estimated body mass index is 25.74 kg/(m^2) as calculated from the following:    Height as of this encounter: 5' 10.5\" (1.791 m).    Weight as of this encounter: 182 lb (82.555 kg).  Medication Reconciliation: complete     Red Law      "

## 2017-02-10 ASSESSMENT — ANXIETY QUESTIONNAIRES: GAD7 TOTAL SCORE: 2

## 2017-02-10 ASSESSMENT — PATIENT HEALTH QUESTIONNAIRE - PHQ9: SUM OF ALL RESPONSES TO PHQ QUESTIONS 1-9: 0

## 2017-02-14 DIAGNOSIS — N52.01 ERECTILE DYSFUNCTION DUE TO ARTERIAL INSUFFICIENCY: Primary | ICD-10-CM

## 2017-02-14 RX ORDER — SILDENAFIL 100 MG/1
100 TABLET, FILM COATED ORAL DAILY PRN
Qty: 6 TABLET | Refills: 11 | Status: SHIPPED | OUTPATIENT
Start: 2017-02-14 | End: 2018-02-07

## 2017-02-14 NOTE — TELEPHONE ENCOUNTER
Wants to go back to 100 mg tablets of viagra, same cost as the 20mg tablets. Would like a print copy to bring to pharmacy of choice.

## 2017-02-14 NOTE — TELEPHONE ENCOUNTER
11:37 AM    Reason for Call: Phone Call    Description: Zeus would like Dr Landeros's nurse to call him back about a prescription.    Was an appointment offered for this call? No    Preferred method for responding to this message: [phone]    If we cannot reach you directly, may we leave a detailed response at the number you provided?  Yes      Janeen Contreras

## 2017-03-15 ENCOUNTER — APPOINTMENT (OUTPATIENT)
Dept: OCCUPATIONAL MEDICINE | Facility: OTHER | Age: 62
End: 2017-03-15
Payer: COMMERCIAL

## 2017-03-15 PROCEDURE — 99000 SPECIMEN HANDLING OFFICE-LAB: CPT

## 2017-04-14 ENCOUNTER — TELEPHONE (OUTPATIENT)
Dept: FAMILY MEDICINE | Facility: OTHER | Age: 62
End: 2017-04-14

## 2017-04-14 ENCOUNTER — OFFICE VISIT (OUTPATIENT)
Dept: FAMILY MEDICINE | Facility: OTHER | Age: 62
End: 2017-04-14
Attending: FAMILY MEDICINE
Payer: COMMERCIAL

## 2017-04-14 VITALS
SYSTOLIC BLOOD PRESSURE: 134 MMHG | HEART RATE: 92 BPM | HEIGHT: 71 IN | WEIGHT: 192 LBS | OXYGEN SATURATION: 96 % | BODY MASS INDEX: 26.88 KG/M2 | DIASTOLIC BLOOD PRESSURE: 82 MMHG | TEMPERATURE: 98.4 F

## 2017-04-14 DIAGNOSIS — L30.9 DERMATITIS: Primary | ICD-10-CM

## 2017-04-14 PROCEDURE — 99213 OFFICE O/P EST LOW 20 MIN: CPT | Performed by: FAMILY MEDICINE

## 2017-04-14 ASSESSMENT — PAIN SCALES - GENERAL: PAINLEVEL: NO PAIN (0)

## 2017-04-14 NOTE — MR AVS SNAPSHOT
"              After Visit Summary   4/14/2017    Zeus Ford    MRN: [id number]           Patient Information     Date Of Birth          1955        Visit Information        Provider Department      4/14/2017 10:30 AM Suzanne Bazzi MD [facility]        Care Instructions    1.  Bacitracin  ointment  2.  If not clearing up, hydrocortisone cream 1% over the counter        Follow-ups after your visit        Your next 10 appointments already scheduled     Apr 14, 2017 10:30 AM CDT   (Arrive by 10:15 AM)   SHORT with Suzanne Bazzi MD   [facility] [place] ([facility] [place] [facility])    [address]  [place] [address]   [phone]            May 16, 2017 11:00 AM CDT   (Arrive by 10:45 AM)   New Visit with YANELI De La Garza MD   [facility] ([facility] [place] [facility]    [address]  [place] [id number] [phone]              Who to contact     If you have questions or need follow up information about today's clinic visit or your schedule please contact [facility] directly at [phone].  Normal or non-critical lab and imaging results will be communicated to you by MyChart, letter or phone within 4 business days after the clinic has received the results. If you do not hear from us within 7 days, please contact the clinic through MyChart or phone. If you have a critical or abnormal lab result, we will notify you by phone as soon as possible.  Submit refill requests through [employer] or call your pharmacy and they will forward the refill request to us. Please allow 3 business days for your refill to be completed.          Additional Information About Your Visit        MyChart Information     [employer] lets you send messages to your doctor, view your test results, renew your prescriptions, schedule appointments and more. To sign up, go to [email or web address] . Click on \"Log in\" on the left side of the screen, which will take you to the Welcome page. " "Then click on \"Sign up Now\" on the right side of the page.     You will be asked to enter the access code listed below, as well as some personal information. Please follow the directions to create your username and password.     Your access code is: [email or web address]  Expires: 2017 10:29 AM     Your access code will  in 90 days. If you need help or a new code, please call your [facility] clinic or [phone].        Care EveryWhere ID     This is your Care EveryWhere ID. This could be used by other organizations to access your [facility] medical records  [id number]        Your Vitals Were     Pulse Temperature Height Pulse Oximetry BMI (Body Mass Index)       92 98.4  F (36.9  C) (Tympanic) 5' 10.5\" (1.791 m) 96% 27.16 kg/m2        Blood Pressure from Last 3 Encounters:   17 134/82   17 120/78   16 168/98    Weight from Last 3 Encounters:   17 192 lb (87.1 kg)   17 182 lb (82.6 kg)   16 187 lb (84.8 kg)              Today, you had the following     No orders found for display       Primary Care Provider Office Phone # Fax #    Cirilo Landeros [phone]       [facility] [address] [facility] [address]        Thank you!     Thank you for choosing [facility]  for your care. Our goal is always to provide you with excellent care. Hearing back from our patients is one way we can continue to improve our services. Please take a few minutes to complete the written survey that you may receive in the mail after your visit with us. Thank you!             Your Updated Medication List - Protect others around you: Learn how to safely use, store and throw away your medicines at www.disposemymeds.org.          This list is accurate as of: 17 10:29 AM.  Always use your most recent med list.                   Brand Name Dispense Instructions for use    ammonium lactate 12 % lotion    LAC-HYDRIN    360 g    Apply topically 2 times daily as " needed for dry skin       EPIPEN 0.3 MG/0.3ML injection   Generic drug:  EPINEPHrine      Inject 0.3 mg into the muscle once as needed. For anaphylaxis       LORazepam 1 MG tablet    ATIVAN    20 tablet    Take 1 tablet (1 mg) by mouth every 8 hours as needed for anxiety       sildenafil 100 MG cap/tab    VIAGRA    6 tablet    Take 1 tablet (100 mg) by mouth daily as needed for erectile dysfunction Take 30 min to 4 hours before intercourse.  Never use with nitroglycerin, terazosin or doxazosin.       terbinafine 1 % cream    lamISIL AT    30 g    Apply topically 2 times daily For fungal infection not resolved with other antifungals (e.g. Clotrimazole)

## 2017-04-14 NOTE — NURSING NOTE
"Chief Complaint   Patient presents with     Derm Problem       Initial /82 (BP Location: Right arm, Patient Position: Chair, Cuff Size: Adult Regular)  Pulse 92  Temp 98.4  F (36.9  C) (Tympanic)  Ht 5' 10.5\" (1.791 m)  Wt 192 lb (87.1 kg)  SpO2 96%  BMI 27.16 kg/m2 Estimated body mass index is 27.16 kg/(m^2) as calculated from the following:    Height as of this encounter: 5' 10.5\" (1.791 m).    Weight as of this encounter: 192 lb (87.1 kg).  Medication Reconciliation: complete     KELLY GROVER  '  "

## 2017-04-14 NOTE — PROGRESS NOTES
SUBJECTIVE:                                                    Zeus Ford is a 61 year old male who presents to clinic today for the following health issues:      Rash      Duration: Noticed this morning.    Description  Location: back of legs  Itching: no    Intensity:  moderate    Accompanying signs and symptoms: patches, redness, denies pain    History (similar episodes/previous evaluation): None    Precipitating or alleviating factors:  New exposures:  None  Recent travel: YES     Therapies tried and outcome: aloe vera      Has been sitting in meetings all week, no known exposures but did stay in hotel.    Problem list and histories reviewed & adjusted, as indicated.  Additional history: as documented    Current Outpatient Prescriptions   Medication Sig Dispense Refill     sildenafil (VIAGRA) 100 MG cap/tab Take 1 tablet (100 mg) by mouth daily as needed for erectile dysfunction Take 30 min to 4 hours before intercourse.  Never use with nitroglycerin, terazosin or doxazosin. 6 tablet 11     LORazepam (ATIVAN) 1 MG tablet Take 1 tablet (1 mg) by mouth every 8 hours as needed for anxiety 20 tablet 0     ammonium lactate (LAC-HYDRIN) 12 % lotion Apply topically 2 times daily as needed for dry skin 360 g 3     terbinafine (LAMISIL AT) 1 % cream Apply topically 2 times daily For fungal infection not resolved with other antifungals (e.g. Clotrimazole) 30 g 1     EPINEPHrine (EPIPEN) 0.3 MG/0.3ML injection Inject 0.3 mg into the muscle once as needed. For anaphylaxis       Labs reviewed in EPIC    Reviewed and updated as needed this visit by clinical staff  Tobacco  Allergies  Meds  Med Hx  Surg Hx  Fam Hx  Soc Hx      Reviewed and updated as needed this visit by Provider         ROS:  Constitutional, HEENT, cardiovascular, pulmonary, gi and gu systems are negative, except as otherwise noted.    OBJECTIVE:                                                    /82 (BP Location: Right arm, Patient  "Position: Chair, Cuff Size: Adult Regular)  Pulse 92  Temp 98.4  F (36.9  C) (Tympanic)  Ht 5' 10.5\" (1.791 m)  Wt 192 lb (87.1 kg)  SpO2 96%  BMI 27.16 kg/m2  Body mass index is 27.16 kg/(m^2).  GENERAL APPEARANCE: healthy, alert and no distress  SKIN: erythema - posterior thighs look more like broken blood vessel or abrasion  PSYCH: mentation appears normal and affect normal/bright       ASSESSMENT/PLAN:                                                    1. Dermatitis  Symmetric bilateral but didn't wear shorts or sit in hot tub  Trial of bacitracin or hydrocortisone OTC   F/u if not improving or worsens    Patient was agreeable to this plan and had no further questions.  See Patient Instructions    Suzanne Bazzi MD  [facility] [place]    "

## 2017-04-14 NOTE — TELEPHONE ENCOUNTER
8:49 AM    Reason for Call: OVERBOOK    Patient is having the following symptoms: Rash on legs for 1 days.    The patient is requesting an appointment for overbook today with Dr Bazzi.    Was an appointment offered for this call? Yes - Monday 4-17-17 pt declined    Preferred method for responding to this message: Telephone Call - [phone]    If we cannot reach you directly, may we leave a detailed response at the number you provided? Yes    Can this message wait until your PCP/provider returns, if unavailable today? No, (PCP Dr Landeros is out)    Eusebia Husain

## 2017-05-09 ENCOUNTER — OFFICE VISIT (OUTPATIENT)
Dept: FAMILY MEDICINE | Facility: OTHER | Age: 62
End: 2017-05-09
Attending: FAMILY MEDICINE
Payer: COMMERCIAL

## 2017-05-09 VITALS
BODY MASS INDEX: 26.93 KG/M2 | OXYGEN SATURATION: 98 % | TEMPERATURE: 96.7 F | HEART RATE: 109 BPM | RESPIRATION RATE: 17 BRPM | DIASTOLIC BLOOD PRESSURE: 78 MMHG | SYSTOLIC BLOOD PRESSURE: 137 MMHG | WEIGHT: 190.4 LBS

## 2017-05-09 DIAGNOSIS — L30.0 NUMMULAR ECZEMATOUS DERMATITIS: Primary | ICD-10-CM

## 2017-05-09 PROCEDURE — 99213 OFFICE O/P EST LOW 20 MIN: CPT | Performed by: FAMILY MEDICINE

## 2017-05-09 RX ORDER — KETOCONAZOLE 20 MG/G
CREAM TOPICAL 2 TIMES DAILY
Qty: 15 G | Refills: 1 | Status: SHIPPED | OUTPATIENT
Start: 2017-05-09 | End: 2022-02-09

## 2017-05-09 RX ORDER — TRIAMCINOLONE ACETONIDE 1 MG/G
CREAM TOPICAL
Qty: 30 G | Refills: 0 | Status: SHIPPED | OUTPATIENT
Start: 2017-05-09 | End: 2018-02-07

## 2017-05-09 ASSESSMENT — PAIN SCALES - GENERAL: PAINLEVEL: NO PAIN (0)

## 2017-05-09 NOTE — MR AVS SNAPSHOT
"              After Visit Summary   5/9/2017    Zeus Ford    MRN: [id number]           Patient Information     Date Of Birth          1955        Visit Information        Provider Department      5/9/2017 10:00 AM Cirilo Landeros MD [facility] [place]        Today's Diagnoses     Nummular eczematous dermatitis    -  1      Care Instructions    Return if worsens.         Follow-ups after your visit        Your next 10 appointments already scheduled     May 16, 2017 11:00 AM CDT   (Arrive by 10:45 AM)   New Visit with YANELI De La Garza MD   [facility] ([facility]    [address] [place] [id number] [phone]              Who to contact     If you have questions or need follow up information about today's clinic visit or your schedule please contact [facility] directly at [phone].  Normal or non-critical lab and imaging results will be communicated to you by MyChart, letter or phone within 4 business days after the clinic has received the results. If you do not hear from us within 7 days, please contact the clinic through MyChart or phone. If you have a critical or abnormal lab result, we will notify you by phone as soon as possible.  Submit refill requests through [employer] or call your pharmacy and they will forward the refill request to us. Please allow 3 business days for your refill to be completed.          Additional Information About Your Visit        MyChart Information     [employer] lets you send messages to your doctor, view your test results, renew your prescriptions, schedule appointments and more. To sign up, go to [email or web address] . Click on \"Log in\" on the left side of the screen, which will take you to the Welcome page. Then click on \"Sign up Now\" on the right side of the page.     You will be asked to enter the access code listed below, as well as some personal information. Please follow the directions to create your username " and password.     Your access code is: [email or web address]  Expires: 2017 10:29 AM     Your access code will  in 90 days. If you need help or a new code, please call your [facility] or [phone].        Care EveryWhere ID     This is your Care EveryWhere ID. This could be used by other organizations to access your [facility] medical records  [id number]        Your Vitals Were     Pulse Temperature Respirations Pulse Oximetry BMI (Body Mass Index)       109 96.7  F (35.9  C) 17 98% 26.93 kg/m2        Blood Pressure from Last 3 Encounters:   17 137/78   17 134/82   17 120/78    Weight from Last 3 Encounters:   17 190 lb 6.4 oz (86.4 kg)   17 192 lb (87.1 kg)   17 182 lb (82.6 kg)              Today, you had the following     No orders found for display         Today's Medication Changes          These changes are accurate as of: 17 10:36 AM.  If you have any questions, ask your nurse or doctor.               Start taking these medicines.        Dose/Directions    ketoconazole 2 % cream   Commonly known as:  NIZORAL   Used for:  Nummular eczematous dermatitis   Started by:  Cirilo Landeros MD        Apply topically 2 times daily   Quantity:  15 g   Refills:  1       triamcinolone 0.1 % cream   Commonly known as:  KENALOG   Used for:  Nummular eczematous dermatitis   Started by:  Cirilo Landeros MD        Apply sparingly to affected area three times daily for 14 days.   Quantity:  30 g   Refills:  0            Where to get your medicines      These medications were sent to [facility] [place] - [address] [address]     Phone:  [phone]     ketoconazole 2 % cream    triamcinolone 0.1 % cream                Primary Care Provider Office Phone # Fax #    Cirilo Landeros [phone]       [facility] [address] [place] [address]        Thank you!     Thank you for choosing [facility]  [facility]. Our goal is always to provide you with excellent care. Hearing back from our patients is one way we can continue to improve our services. Please take a few minutes to complete the written survey that you may receive in the mail after your visit with us. Thank you!             Your Updated Medication List - Protect others around you: Learn how to safely use, store and throw away your medicines at www.disposemymeds.org.          This list is accurate as of: 5/9/17 10:36 AM.  Always use your most recent med list.                   Brand Name Dispense Instructions for use    ammonium lactate 12 % lotion    LAC-HYDRIN    360 g    Apply topically 2 times daily as needed for dry skin       EPIPEN 0.3 MG/0.3ML injection   Generic drug:  EPINEPHrine      Inject 0.3 mg into the muscle once as needed. For anaphylaxis       ketoconazole 2 % cream    NIZORAL    15 g    Apply topically 2 times daily       LORazepam 1 MG tablet    ATIVAN    20 tablet    Take 1 tablet (1 mg) by mouth every 8 hours as needed for anxiety       sildenafil 100 MG cap/tab    VIAGRA    6 tablet    Take 1 tablet (100 mg) by mouth daily as needed for erectile dysfunction Take 30 min to 4 hours before intercourse.  Never use with nitroglycerin, terazosin or doxazosin.       terbinafine 1 % cream    lamISIL AT    30 g    Apply topically 2 times daily For fungal infection not resolved with other antifungals (e.g. Clotrimazole)       triamcinolone 0.1 % cream    KENALOG    30 g    Apply sparingly to affected area three times daily for 14 days.

## 2017-05-09 NOTE — NURSING NOTE
"Chief Complaint   Patient presents with     Rash       Initial /78  Pulse 109  Temp 96.7  F (35.9  C)  Resp 17  Wt 190 lb 6.4 oz (86.4 kg)  SpO2 98%  BMI 26.93 kg/m2 Estimated body mass index is 26.93 kg/(m^2) as calculated from the following:    Height as of 4/14/17: 5' 10.5\" (1.791 m).    Weight as of this encounter: 190 lb 6.4 oz (86.4 kg).  Medication Reconciliation: complete  "

## 2017-05-09 NOTE — PROGRESS NOTES
SUBJECTIVE:                                                    Zeus Ford is a 61 year old male who presents to clinic today for the following health issues:        Rash      Duration: 1 month     Description  Location:2 back of right leg, 1 back of left leg   Itching: no    Intensity:  mild    Accompanying signs and symptoms: redness, pink in the AM- has gotten smaller in size but still there      History (similar episodes/previous evaluation): None    Precipitating or alleviating factors:  New exposures:  None  Recent travel: no      Therapies tried and outcome: hydrocortisone cream, antibiotic cream                Problem list and histories reviewed & adjusted, as indicated.  Additional history: as documented        ROS:  C: NEGATIVE for fever, chills, change in weight    OBJECTIVE:                                                    /78  Pulse 109  Temp 96.7  F (35.9  C)  Resp 17  Wt 190 lb 6.4 oz (86.4 kg)  SpO2 98%  BMI 26.93 kg/m2  Body mass index is 26.93 kg/(m^2).   GENERAL: healthy, alert, well nourished, well hydrated, no distress  SKIN: several nummular dry erythematous patches on back on legs and one on lower right leg.          ASSESSMENT/PLAN:                                                        ICD-10-CM    1. Nummular eczematous dermatitis L30.0 ketoconazole (NIZORAL) 2 % cream     triamcinolone (KENALOG) 0.1 % cream     Looks benign - will try the above and try to get more sunlight on legs. F/u prn. Symptomatic treatment was discussed along when patient should call and/or come back into the clinic or go to ER/Urgent care. All questions answered.       See Patient Instructions    Cirilo Landeros MD  [facility]

## 2017-05-16 ENCOUNTER — OFFICE VISIT (OUTPATIENT)
Dept: DERMATOLOGY | Facility: OTHER | Age: 62
End: 2017-05-16
Attending: FAMILY MEDICINE
Payer: COMMERCIAL

## 2017-05-16 VITALS
SYSTOLIC BLOOD PRESSURE: 128 MMHG | BODY MASS INDEX: 26.6 KG/M2 | DIASTOLIC BLOOD PRESSURE: 76 MMHG | WEIGHT: 190 LBS | HEART RATE: 96 BPM | HEIGHT: 71 IN

## 2017-05-16 DIAGNOSIS — L98.9 SKIN LESION: Primary | ICD-10-CM

## 2017-05-16 DIAGNOSIS — L71.9 ACNE ROSACEA: ICD-10-CM

## 2017-05-16 DIAGNOSIS — L30.8 OTHER ECZEMA: ICD-10-CM

## 2017-05-16 DIAGNOSIS — C80.1: ICD-10-CM

## 2017-05-16 DIAGNOSIS — D23.9 ATYPICAL MOLE SYNDROME: ICD-10-CM

## 2017-05-16 PROCEDURE — 88305 TISSUE EXAM BY PATHOLOGIST: CPT | Mod: TC | Performed by: DERMATOLOGY

## 2017-05-16 PROCEDURE — 11100 HC BIOPSY SKIN/SUBQ/MUC MEM, SINGLE LESION: CPT | Performed by: DERMATOLOGY

## 2017-05-16 PROCEDURE — 99213 OFFICE O/P EST LOW 20 MIN: CPT | Mod: 25 | Performed by: DERMATOLOGY

## 2017-05-16 RX ORDER — METRONIDAZOLE 7.5 MG/G
GEL TOPICAL
Qty: 45 G | Refills: 3 | Status: SHIPPED | OUTPATIENT
Start: 2017-05-16 | End: 2020-02-10

## 2017-05-16 ASSESSMENT — PAIN SCALES - GENERAL: PAINLEVEL: NO PAIN (0)

## 2017-05-16 NOTE — PROGRESS NOTES
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  He, however, had not had much trouble with moles.  He recently developed a rash for which he was seen by Dr. Landeros who prescribed triamcinolone for suspected nummular eczema.  This has worked very well for him, the lesions are disappearing quickly.  He does have some concerns about a lesion on his right lower ankle area.  There is a pink papular he states that is present for 2 months.  It is intensely itchy and he is quite worried about it for some reason.      OBJECTIVE:  Shows a roughly 8-9 mm pink papule, quite nonspecific.  Dermoscopy does not help to diagnose it.  He is quite interested in having it removed so we went ahead and shaved removed after proper prepping and his permission.      Also he stated that he had had  a lesion on his scrotum.  I checked carefully but could not see anything there today.  We checked his face, neck, chest, back, arms, legs, hands and feet.  We found no atypical moles.  He does have a bit of rosacea it would appear and I prescribed MetroGel 0.75 to use nightly for this condition, which he states leaves him with scars, actually was leaving red marks only.      PLAN:  Return on a p.r.n. basis to recheck these various problems.  I will send a report of the biopsy of the ankle lesion when I have it available.         YANELI VASQUEZ MD             D: 2017 12:17   T: 2017 12:44   MT: STEPH      Name:     ZEUS ACUNA   MRN:      [id number]        Account:      [id number]   :      1955           Service Date: 2017      Document: [id number]       cc: Cirilo Landeros MD

## 2017-05-16 NOTE — MR AVS SNAPSHOT
"              After Visit Summary   2017    Zeus Ford    MRN: [id number]           Patient Information     Date Of Birth          1955        Visit Information        Provider Department      2017 11:00 AM YANELI De La Garza MD [facility]        Today's Diagnoses     Skin lesion    -  1    Atypical mole syndrome        Acne rosacea        Malignant neoplasm of unknown origin (H)        Other eczema           Follow-ups after your visit        Who to contact     If you have questions or need follow up information about today's clinic visit or your schedule please contact [facility] directly at [phone].  Normal or non-critical lab and imaging results will be communicated to you by MyChart, letter or phone within 4 business days after the clinic has received the results. If you do not hear from us within 7 days, please contact the clinic through MyChart or phone. If you have a critical or abnormal lab result, we will notify you by phone as soon as possible.  Submit refill requests through [employer] or call your pharmacy and they will forward the refill request to us. Please allow 3 business days for your refill to be completed.          Additional Information About Your Visit        MyChart Information     [employer] lets you send messages to your doctor, view your test results, renew your prescriptions, schedule appointments and more. To sign up, go to [email or web address] . Click on \"Log in\" on the left side of the screen, which will take you to the Welcome page. Then click on \"Sign up Now\" on the right side of the page.     You will be asked to enter the access code listed below, as well as some personal information. Please follow the directions to create your username and password.     Your access code is: [email or web address]  Expires: 2017 10:29 AM     Your access code will  in 90 days. If you need help or a new code, please call your [place] clinic or " "[phone].        Care EveryWhere ID     This is your Care EveryWhere ID. This could be used by other organizations to access your [place] medical records  [id number]        Your Vitals Were     Pulse Height BMI (Body Mass Index)             96 5' 10.5\" (1.791 m) 26.88 kg/m2          Blood Pressure from Last 3 Encounters:   05/16/17 128/76   05/09/17 137/78   04/14/17 134/82    Weight from Last 3 Encounters:   05/16/17 190 lb (86.2 kg)   05/09/17 190 lb 6.4 oz (86.4 kg)   04/14/17 192 lb (87.1 kg)              We Performed the Following     BIOPSY SKIN/SUBQ/MUC MEM, SINGLE LESION     Surgical pathology exam          Today's Medication Changes          These changes are accurate as of: 5/16/17 11:59 PM.  If you have any questions, ask your nurse or doctor.               Start taking these medicines.        Dose/Directions    metroNIDAZOLE 0.75 % topical gel   Commonly known as:  METROGEL   Used for:  Acne rosacea   Started by:  YANELI De La Garza MD        Apply to face h.s. For red papules   Quantity:  45 g   Refills:  3            Where to get your medicines      These medications were sent to [facility] [place]  [address]  [address] [place] [address]     Phone:  [phone]     metroNIDAZOLE 0.75 % topical gel                Primary Care Provider Office Phone # Fax #    Cirilo Landeros [phone]       [facility] [address] [facility] [place] [address]        Thank you!     Thank you for choosing [facility]  for your care. Our goal is always to provide you with excellent care. Hearing back from our patients is one way we can continue to improve our services. Please take a few minutes to complete the written survey that you may receive in the mail after your visit with us. Thank you!             Your Updated Medication List - Protect others around you: Learn how to safely use, store and throw away your medicines at " www.disposemymeds.org.          This list is accurate as of: 5/16/17 11:59 PM.  Always use your most recent med list.                   Brand Name Dispense Instructions for use    ammonium lactate 12 % lotion    LAC-HYDRIN    360 g    Apply topically 2 times daily as needed for dry skin       EPIPEN 0.3 MG/0.3ML injection   Generic drug:  EPINEPHrine      Inject 0.3 mg into the muscle once as needed. For anaphylaxis       ketoconazole 2 % cream    NIZORAL    15 g    Apply topically 2 times daily       LORazepam 1 MG tablet    ATIVAN    20 tablet    Take 1 tablet (1 mg) by mouth every 8 hours as needed for anxiety       metroNIDAZOLE 0.75 % topical gel    METROGEL    45 g    Apply to face h.s. For red papules       sildenafil 100 MG cap/tab    VIAGRA    6 tablet    Take 1 tablet (100 mg) by mouth daily as needed for erectile dysfunction Take 30 min to 4 hours before intercourse.  Never use with nitroglycerin, terazosin or doxazosin.       terbinafine 1 % cream    lamISIL AT    30 g    Apply topically 2 times daily For fungal infection not resolved with other antifungals (e.g. Clotrimazole)       triamcinolone 0.1 % cream    KENALOG    30 g    Apply sparingly to affected area three times daily for 14 days.

## 2017-05-16 NOTE — NURSING NOTE
"Chief Complaint   Patient presents with     Derm Problem     Atypical Mole; last seen by Dr. De La Garza on 10/20/15       Initial /76 (Cuff Size: Adult Regular)  Pulse 96  Ht 1.791 m (5' 10.5\")  Wt 86.2 kg (190 lb)  BMI 26.88 kg/m2 Estimated body mass index is 26.88 kg/(m^2) as calculated from the following:    Height as of this encounter: 1.791 m (5' 10.5\").    Weight as of this encounter: 86.2 kg (190 lb).  Medication Reconciliation: complete   Latasha Michaels      "

## 2017-05-19 ENCOUNTER — HOSPITAL PATHOLOGY (OUTPATIENT)
Dept: OTHER | Facility: CLINIC | Age: 62
End: 2017-05-19

## 2017-05-22 LAB — COPATH REPORT: NORMAL

## 2017-05-24 LAB — COPATH REPORT: NORMAL

## 2017-10-26 ENCOUNTER — ALLIED HEALTH/NURSE VISIT (OUTPATIENT)
Dept: FAMILY MEDICINE | Facility: OTHER | Age: 62
End: 2017-10-26
Attending: PHYSICIAN ASSISTANT
Payer: COMMERCIAL

## 2017-10-26 DIAGNOSIS — Z23 NEED FOR PROPHYLACTIC VACCINATION AND INOCULATION AGAINST INFLUENZA: Primary | ICD-10-CM

## 2017-10-26 PROCEDURE — 90686 IIV4 VACC NO PRSV 0.5 ML IM: CPT

## 2017-10-26 PROCEDURE — 90471 IMMUNIZATION ADMIN: CPT

## 2017-10-26 NOTE — PROGRESS NOTES

## 2017-10-26 NOTE — MR AVS SNAPSHOT
"              After Visit Summary   10/26/2017    Zeus Ford    MRN: [id number]           Patient Information     Date Of Birth          1955        Visit Information        Provider Department      10/26/2017 8:00 AM  [facility]        Today's Diagnoses     Need for prophylactic vaccination and inoculation against influenza    -  1       Follow-ups after your visit        Who to contact     If you have questions or need follow up information about today's clinic visit or your schedule please contact [facility] directly at [phone].  Normal or non-critical lab and imaging results will be communicated to you by [employer], letter or phone within 4 business days after the clinic has received the results. If you do not hear from us within 7 days, please contact the clinic through [employer] or phone. If you have a critical or abnormal lab result, we will notify you by phone as soon as possible.  Submit refill requests through [employer] or call your pharmacy and they will forward the refill request to us. Please allow 3 business days for your refill to be completed.          Additional Information About Your Visit        MyChart Information     [employer] lets you send messages to your doctor, view your test results, renew your prescriptions, schedule appointments and more. To sign up, go to [email or web address] . Click on \"Log in\" on the left side of the screen, which will take you to the Welcome page. Then click on \"Sign up Now\" on the right side of the page.     You will be asked to enter the access code listed below, as well as some personal information. Please follow the directions to create your username and password.     Your access code is: [email or web address]  Expires: 2018  8:11 AM     Your access code will  in 90 days. If you need help or a new code, please call your [facility] or [phone].        Care EveryWhere ID     This is your Care " EveryWhere ID. This could be used by other organizations to access your [place] medical records  [id number]         Blood Pressure from Last 3 Encounters:   05/16/17 128/76   05/09/17 137/78   04/14/17 134/82    Weight from Last 3 Encounters:   05/16/17 190 lb (86.2 kg)   05/09/17 190 lb 6.4 oz (86.4 kg)   04/14/17 192 lb (87.1 kg)              We Performed the Following     HC FLU VAC PRESRV FREE QUAD SPLIT VIR 3+YRS IM     Vaccine Administration, Initial [[id number]]        Primary Care Provider Office Phone # Fax #    Cirilo Landeros [phone]       [facility] [address]  [facility] [address]        Equal Access to Services     DEIDRE JANE : Hadii anali fernandez hadasho Soomaali, waaxda luqadaha, qaybta kaalmada adeegyada, adama linares . So [facility] [phone].    ATENCIÓN: Si habla español, tiene a glover disposición servicios gratuitos de asistencia lingüística. Llame al [phone].    We comply with applicable federal civil rights laws and [place] laws. We do not discriminate on the basis of race, color, national origin, age, disability, sex, sexual orientation, or gender identity.            Thank you!     Thank you for choosing [facility]  for your care. Our goal is always to provide you with excellent care. Hearing back from our patients is one way we can continue to improve our services. Please take a few minutes to complete the written survey that you may receive in the mail after your visit with us. Thank you!             Your Updated Medication List - Protect others around you: Learn how to safely use, store and throw away your medicines at www.disposemymeds.org.          This list is accurate as of: 10/26/17  8:11 AM.  Always use your most recent med list.                   Brand Name Dispense Instructions for use Diagnosis    ammonium lactate 12 % lotion    LAC-HYDRIN    360 g    Apply topically 2 times daily as needed for dry skin     Tinea pedis       EPIPEN 0.3 MG/0.3ML injection   Generic drug:  EPINEPHrine      Inject 0.3 mg into the muscle once as needed. For anaphylaxis        ketoconazole 2 % cream    NIZORAL    15 g    Apply topically 2 times daily    Nummular eczematous dermatitis       LORazepam 1 MG tablet    ATIVAN    20 tablet    Take 1 tablet (1 mg) by mouth every 8 hours as needed for anxiety    Situational anxiety       metroNIDAZOLE 0.75 % topical gel    METROGEL    45 g    Apply to face h.s. For red papules    Acne rosacea       sildenafil 100 MG tablet    VIAGRA    6 tablet    Take 1 tablet (100 mg) by mouth daily as needed for erectile dysfunction Take 30 min to 4 hours before intercourse.  Never use with nitroglycerin, terazosin or doxazosin.    Erectile dysfunction due to arterial insufficiency       terbinafine 1 % cream    lamISIL AT    30 g    Apply topically 2 times daily For fungal infection not resolved with other antifungals (e.g. Clotrimazole)    Tinea pedis       triamcinolone 0.1 % cream    KENALOG    30 g    Apply sparingly to affected area three times daily for 14 days.    Nummular eczematous dermatitis

## 2017-12-18 ENCOUNTER — TELEPHONE (OUTPATIENT)
Dept: FAMILY MEDICINE | Facility: OTHER | Age: 62
End: 2017-12-18

## 2017-12-18 DIAGNOSIS — E78.00 PURE HYPERCHOLESTEROLEMIA: ICD-10-CM

## 2017-12-18 DIAGNOSIS — Z80.42 FAMILY HISTORY OF MALIGNANT NEOPLASM OF PROSTATE: Primary | ICD-10-CM

## 2017-12-18 DIAGNOSIS — R35.1 NOCTURIA: ICD-10-CM

## 2017-12-18 DIAGNOSIS — R03.0 ELEVATED BLOOD PRESSURE READING WITHOUT DIAGNOSIS OF HYPERTENSION: ICD-10-CM

## 2017-12-18 NOTE — TELEPHONE ENCOUNTER
10:56 AM    Reason for Call: Phone Call    Description: Pt called and scheduled his physical for 02/13/18. He will come in on 02/12/18 to do his fasting labs. Please call pt if there is an issue with this.    Was an appointment offered for this call? Yes  If yes : Appointment type physical              Date  02/13/18    Preferred method for responding to this message: Telephone Call   What is your phone number ?  [phone]    If we cannot reach you directly, may we leave a detailed response at the number you provided? Yes    Can this message wait until your PCP/provider returns, if available today? Not applicable    Luz Bui

## 2018-02-06 DIAGNOSIS — R03.0 ELEVATED BLOOD PRESSURE READING WITHOUT DIAGNOSIS OF HYPERTENSION: ICD-10-CM

## 2018-02-06 DIAGNOSIS — E78.00 PURE HYPERCHOLESTEROLEMIA: ICD-10-CM

## 2018-02-06 DIAGNOSIS — Z80.42 FAMILY HISTORY OF MALIGNANT NEOPLASM OF PROSTATE: ICD-10-CM

## 2018-02-06 DIAGNOSIS — R35.1 NOCTURIA: ICD-10-CM

## 2018-02-06 LAB
ALBUMIN SERPL-MCNC: 4.4 G/DL (ref 3.4–5)
ALP SERPL-CCNC: 63 U/L (ref 40–150)
ALT SERPL W P-5'-P-CCNC: 31 U/L (ref 0–70)
ANION GAP SERPL CALCULATED.3IONS-SCNC: 8 MMOL/L (ref 3–14)
AST SERPL W P-5'-P-CCNC: 24 U/L (ref 0–45)
BASOPHILS # BLD AUTO: 0 10E9/L (ref 0–0.2)
BASOPHILS NFR BLD AUTO: 1.3 %
BILIRUB SERPL-MCNC: 1.1 MG/DL (ref 0.2–1.3)
BUN SERPL-MCNC: 17 MG/DL (ref 7–30)
CALCIUM SERPL-MCNC: 9.2 MG/DL (ref 8.5–10.1)
CHLORIDE SERPL-SCNC: 107 MMOL/L (ref 94–109)
CHOLEST SERPL-MCNC: 190 MG/DL
CO2 SERPL-SCNC: 25 MMOL/L (ref 20–32)
CREAT SERPL-MCNC: 0.86 MG/DL (ref 0.66–1.25)
DIFFERENTIAL METHOD BLD: ABNORMAL
EOSINOPHIL # BLD AUTO: 0 10E9/L (ref 0–0.7)
EOSINOPHIL NFR BLD AUTO: 0.9 %
ERYTHROCYTE [DISTWIDTH] IN BLOOD BY AUTOMATED COUNT: 12.8 % (ref 10–15)
GFR SERPL CREATININE-BSD FRML MDRD: 89 ML/MIN/1.7M2
GLUCOSE SERPL-MCNC: 72 MG/DL (ref 70–99)
HCT VFR BLD AUTO: 43.9 % (ref 40–53)
HDLC SERPL-MCNC: 66 MG/DL
HGB BLD-MCNC: 14.5 G/DL (ref 13.3–17.7)
IMM GRANULOCYTES # BLD: 0 10E9/L (ref 0–0.4)
IMM GRANULOCYTES NFR BLD: 0.3 %
LDLC SERPL CALC-MCNC: 111 MG/DL
LYMPHOCYTES # BLD AUTO: 1.3 10E9/L (ref 0.8–5.3)
LYMPHOCYTES NFR BLD AUTO: 42.3 %
MCH RBC QN AUTO: 32 PG (ref 26.5–33)
MCHC RBC AUTO-ENTMCNC: 33 G/DL (ref 31.5–36.5)
MCV RBC AUTO: 97 FL (ref 78–100)
MONOCYTES # BLD AUTO: 0.4 10E9/L (ref 0–1.3)
MONOCYTES NFR BLD AUTO: 12.9 %
NEUTROPHILS # BLD AUTO: 1.3 10E9/L (ref 1.6–8.3)
NEUTROPHILS NFR BLD AUTO: 42.3 %
NONHDLC SERPL-MCNC: 124 MG/DL
NRBC # BLD AUTO: 0 10*3/UL
NRBC BLD AUTO-RTO: 0 /100
PLATELET # BLD AUTO: 202 10E9/L (ref 150–450)
POTASSIUM SERPL-SCNC: 4.5 MMOL/L (ref 3.4–5.3)
PROT SERPL-MCNC: 8 G/DL (ref 6.8–8.8)
PSA SERPL-ACNC: 1.22 UG/L (ref 0–4)
RBC # BLD AUTO: 4.53 10E12/L (ref 4.4–5.9)
SODIUM SERPL-SCNC: 140 MMOL/L (ref 133–144)
TRIGL SERPL-MCNC: 66 MG/DL
WBC # BLD AUTO: 3.2 10E9/L (ref 4–11)

## 2018-02-06 PROCEDURE — 84153 ASSAY OF PSA TOTAL: CPT | Performed by: FAMILY MEDICINE

## 2018-02-06 PROCEDURE — 80053 COMPREHEN METABOLIC PANEL: CPT | Performed by: FAMILY MEDICINE

## 2018-02-06 PROCEDURE — 85025 COMPLETE CBC W/AUTO DIFF WBC: CPT | Performed by: FAMILY MEDICINE

## 2018-02-06 PROCEDURE — 80061 LIPID PANEL: CPT | Performed by: FAMILY MEDICINE

## 2018-02-06 PROCEDURE — 36415 COLL VENOUS BLD VENIPUNCTURE: CPT | Performed by: FAMILY MEDICINE

## 2018-02-07 ENCOUNTER — OFFICE VISIT (OUTPATIENT)
Dept: FAMILY MEDICINE | Facility: OTHER | Age: 63
End: 2018-02-07
Attending: FAMILY MEDICINE
Payer: COMMERCIAL

## 2018-02-07 VITALS
HEART RATE: 74 BPM | BODY MASS INDEX: 25.91 KG/M2 | HEIGHT: 70 IN | DIASTOLIC BLOOD PRESSURE: 70 MMHG | SYSTOLIC BLOOD PRESSURE: 138 MMHG | TEMPERATURE: 97.5 F | WEIGHT: 181 LBS

## 2018-02-07 DIAGNOSIS — R03.0 ELEVATED BLOOD PRESSURE READING WITHOUT DIAGNOSIS OF HYPERTENSION: ICD-10-CM

## 2018-02-07 DIAGNOSIS — Z80.42 FAMILY HISTORY OF MALIGNANT NEOPLASM OF PROSTATE: ICD-10-CM

## 2018-02-07 DIAGNOSIS — L30.0 NUMMULAR ECZEMATOUS DERMATITIS: ICD-10-CM

## 2018-02-07 DIAGNOSIS — E78.00 PURE HYPERCHOLESTEROLEMIA: ICD-10-CM

## 2018-02-07 DIAGNOSIS — N52.01 ERECTILE DYSFUNCTION DUE TO ARTERIAL INSUFFICIENCY: ICD-10-CM

## 2018-02-07 DIAGNOSIS — L91.8 SKIN TAG: ICD-10-CM

## 2018-02-07 DIAGNOSIS — Z00.00 ROUTINE GENERAL MEDICAL EXAMINATION AT A HEALTH CARE FACILITY: Primary | ICD-10-CM

## 2018-02-07 DIAGNOSIS — F41.8 SITUATIONAL ANXIETY: ICD-10-CM

## 2018-02-07 PROCEDURE — 11200 RMVL SKIN TAGS UP TO&INC 15: CPT | Performed by: FAMILY MEDICINE

## 2018-02-07 PROCEDURE — 99396 PREV VISIT EST AGE 40-64: CPT | Mod: 25 | Performed by: FAMILY MEDICINE

## 2018-02-07 RX ORDER — SILDENAFIL 100 MG/1
100 TABLET, FILM COATED ORAL DAILY PRN
Qty: 6 TABLET | Refills: 11 | Status: SHIPPED | OUTPATIENT
Start: 2018-02-07 | End: 2019-02-11

## 2018-02-07 RX ORDER — TRIAMCINOLONE ACETONIDE 1 MG/G
CREAM TOPICAL
Qty: 30 G | Refills: 3 | Status: SHIPPED | OUTPATIENT
Start: 2018-02-07 | End: 2019-02-11

## 2018-02-07 RX ORDER — LORAZEPAM 1 MG/1
1 TABLET ORAL EVERY 8 HOURS PRN
Qty: 20 TABLET | Refills: 0 | Status: SHIPPED | OUTPATIENT
Start: 2018-02-07 | End: 2019-02-11

## 2018-02-07 ASSESSMENT — ANXIETY QUESTIONNAIRES
7. FEELING AFRAID AS IF SOMETHING AWFUL MIGHT HAPPEN: NOT AT ALL
GAD7 TOTAL SCORE: 0
2. NOT BEING ABLE TO STOP OR CONTROL WORRYING: NOT AT ALL
4. TROUBLE RELAXING: NOT AT ALL
5. BEING SO RESTLESS THAT IT IS HARD TO SIT STILL: NOT AT ALL
6. BECOMING EASILY ANNOYED OR IRRITABLE: NOT AT ALL
1. FEELING NERVOUS, ANXIOUS, OR ON EDGE: NOT AT ALL
3. WORRYING TOO MUCH ABOUT DIFFERENT THINGS: NOT AT ALL

## 2018-02-07 ASSESSMENT — PAIN SCALES - GENERAL: PAINLEVEL: NO PAIN (0)

## 2018-02-07 NOTE — NURSING NOTE
"Chief Complaint   Patient presents with     Physical       Initial /64  Pulse 74  Temp 97.5  F (36.4  C)  Ht 5' 10\" (1.778 m)  Wt 181 lb (82.1 kg)  BMI 25.97 kg/m2 Estimated body mass index is 25.97 kg/(m^2) as calculated from the following:    Height as of this encounter: 5' 10\" (1.778 m).    Weight as of this encounter: 181 lb (82.1 kg).  Medication Reconciliation: complete     Red Law      "

## 2018-02-07 NOTE — MR AVS SNAPSHOT
After Visit Summary   2/7/2018    Zeus Ford    MRN: [id number]           Patient Information     Date Of Birth          1955        Visit Information        Provider Department      2/7/2018 8:00 AM Cirilo Landeros MD [facility]        Today's Diagnoses     Routine general medical examination at a health care facility    -  1    Situational anxiety        Nummular eczematous dermatitis        Erectile dysfunction due to arterial insufficiency        hypercholesterolemia        Family history of malignant neoplasm of prostate        Elevated BP/Pulse -white coat syndrome.          Care Instructions      Preventive Health Recommendations  Male Ages 50 - 64    Yearly exam:             See your health care provider every year in order to  o   Review health changes.   o   Discuss preventive care.    o   Review your medicines if your doctor has prescribed any.     Have a cholesterol test every 5 years, or more frequently if you are at risk for high cholesterol/heart disease.     Have a diabetes test (fasting glucose) every three years. If you are at risk for diabetes, you should have this test more often.     Have a colonoscopy at age 50, or have a yearly FIT test (stool test). These exams will check for colon cancer.      Talk with your health care provider about whether or not a prostate cancer screening test (PSA) is right for you.    You should be tested each year for STDs (sexually transmitted diseases), if you re at risk.     Shots: Get a flu shot each year. Get a tetanus shot every 10 years.     Nutrition:    Eat at least 5 servings of fruits and vegetables daily.     Eat whole-grain bread, whole-wheat pasta and brown rice instead of white grains and rice.     Talk to your provider about Calcium and Vitamin D.     Lifestyle    Exercise for at least 150 minutes a week (30 minutes a day, 5 days a week). This will help you control your weight and prevent disease.     Limit  alcohol to one drink per day.     No smoking.     Wear sunscreen to prevent skin cancer.     See your dentist every six months for an exam and cleaning.     See your eye doctor every 1 to 2 years.     Ref Range & Units 1d ago   1yr ago         WBC 4.0 - 11.0 10e9/L 3.2 (L) 6.6      RBC Count 4.4 - 5.9 10e12/L 4.53 4.54      Hemoglobin 13.3 - 17.7 g/dL 14.5 14.7      Hematocrit 40.0 - 53.0 % 43.9 44.4      MCV 78 - 100 fl 97 98      MCH 26.5 - 33.0 pg 32.0 32.4      MCHC 31.5 - 36.5 g/dL 33.0 33.1      RDW 10.0 - 15.0 % 12.8 12.9      Platelet Count 150 - 450 10e9/L 202 192      Diff Method  Automated Method Automated Method      % Neutrophils % 42.3 68.7      % Lymphocytes % 42.3 20.2      % Monocytes % 12.9 8.9      % Eosinophils % 0.9 0.9      % Basophils % 1.3 0.8      % Immature Granulocytes % 0.3 0.5      Nucleated RBCs 0 /100 0 0      Absolute Neutrophil 1.6 - 8.3 10e9/L 1.3 (L) 4.6      Absolute Lymphocytes 0.8 - 5.3 10e9/L 1.3 1.3      Absolute Monocytes 0.0 - 1.3 10e9/L 0.4 0.6      Absolute Eosinophils 0.0 - 0.7 10e9/L 0.0 0.1      Absolute Basophils 0.0 - 0.2 10e9/L 0.0 0.1      Abs Immature Granulocytes 0 - 0.4 10e9/L 0.0 0.0      Absolute Nucleated RBC  0.0 0.0     Resulting Agency  [facility]          Specimen Collected: 02/06/18 10:39 AM Last Resulted: 02/06/18 11:17 AM                                 Comprehensive metabolic panel   Status:  Final result   Visible to patient:  No (Not Released) Dx:  hypercholesterolemia; Elevated BP/Pul... Order: [id number]             Ref Range & Units 1d ago   1yr ago        Sodium 133 - 144 mmol/L 140 138      Potassium 3.4 - 5.3 mmol/L 4.5 4.3      Chloride 94 - 109 mmol/L 107 103      Carbon Dioxide 20 - 32 mmol/L 25 26      Anion Gap 3 - 14 mmol/L 8 9      Glucose 70 - 99 mg/dL 72 86CM      Urea Nitrogen 7 - 30 mg/dL 17 17      Creatinine 0.66 - 1.25 mg/dL 0.86 0.89      GFR Estimate >60 mL/min/1.7m2 89 87CM     Comments: Non  GFR Calc      GFR  Estimate If Black >60 mL/min/1.7m2 >90 >90  African American GFR Calc      Comments:  GFR Calc      Calcium 8.5 - 10.1 mg/dL 9.2 9.1      Bilirubin Total 0.2 - 1.3 mg/dL 1.1 1.1      Albumin 3.4 - 5.0 g/dL 4.4 4.4      Protein Total 6.8 - 8.8 g/dL 8.0 8.1      Alkaline Phosphatase 40 - 150 U/L 63 67      ALT 0 - 70 U/L 31 26      AST 0 - 45 U/L 24 18     Resulting [facility]          Specimen Collected: 02/06/18 10:39 AM Last Resulted: 02/06/18 11:07 AM                CM=Additional comments                      PSA, screen   Status:  Final result   Visible to patient:  No (Not Released) Dx:  Nocturia; Family history of malignant... Order: [id number]             Ref Range & Units 1d ago   1yr ago        PSA 0 - 4 ug/L 1.22 1.93CM     Comments: Assay Method:  Chemiluminescence using Siemens Vista analyzer     Resulting [facility]          Specimen Collected: 02/06/18 10:39 AM Last Resulted: 02/06/18 11:07 AM                CM=Additional comments                     Lipid Profile (Chol, Trig, HDL, LDL calc)   Status:  Final result   Visible to patient:  No (Not Released) Dx:  hypercholesterolemia Order: [id number]             Ref Range & Units 1d ago   1yr ago        Cholesterol <200 mg/dL 190 187      Triglycerides <150 mg/dL 66 57CM      HDL Cholesterol >39 mg/dL 66 76      LDL Cholesterol Calculated <100 mg/dL 111 (H) 100 (H)CM     Comments: Above desirable:  100-129 mg/dl   Borderline High:  130-159 mg/dL   High:             160-189 mg/dL   Very high:       >189 mg/dl         Non HDL Cholesterol <130 mg/dL 124 111     Resulting [facility]          Specimen Collected: 02/06/18 10:39 AM Last Resulted: 02/06/18 11:07 AM                CM=Additional comments                                Follow-ups after your visit        Who to contact     If you have questions or need follow up information about today's clinic visit or your schedule please contact [facility] directly at  "[phone].  Normal or non-critical lab and imaging results will be communicated to you by [employer], letter or phone within 4 business days after the clinic has received the results. If you do not hear from us within 7 days, please contact the clinic through [employer] or phone. If you have a critical or abnormal lab result, we will notify you by phone as soon as possible.  Submit refill requests through [employer] or call your pharmacy and they will forward the refill request to us. Please allow 3 business days for your refill to be completed.          Additional Information About Your Visit        [employer] Information     [employer] lets you send messages to your doctor, view your test results, renew your prescriptions, schedule appointments and more. To sign up, go to [email or web address] . Click on \"Log in\" on the left side of the screen, which will take you to the Welcome page. Then click on \"Sign up Now\" on the right side of the page.     You will be asked to enter the access code listed below, as well as some personal information. Please follow the directions to create your username and password.     Your access code is: [email or web address]  Expires: 2018  8:07 AM     Your access code will  in 90 days. If you need help or a new code, please call your [facility] clinic or [phone].        Care EveryWhere ID     This is your Care EveryWhere ID. This could be used by other organizations to access your [facility] medical records  [id number]        Your Vitals Were     Pulse Temperature Height BMI (Body Mass Index)          74 97.5  F (36.4  C) 5' 10\" (1.778 m) 25.97 kg/m2         Blood Pressure from Last 3 Encounters:   18 138/70   17 128/76   17 137/78    Weight from Last 3 Encounters:   18 181 lb (82.1 kg)   17 190 lb (86.2 kg)   17 190 lb 6.4 oz (86.4 kg)              Today, you had the following     No orders found for display         Today's Medication Changes        "   These changes are accurate as of 2/7/18  8:09 AM.  If you have any questions, ask your nurse or doctor.               These medicines have changed or have updated prescriptions.        Dose/Directions    sildenafil 100 MG tablet   Commonly known as:  VIAGRA   This may have changed:  reasons to take this   Used for:  Erectile dysfunction due to arterial insufficiency   Changed by:  Cirilo Landeros MD        Dose:  100 mg   Take 1 tablet (100 mg) by mouth daily as needed Take 30 min to 4 hours before intercourse.  Never use with nitroglycerin, terazosin or doxazosin.   Quantity:  6 tablet   Refills:  11            Where to get your medicines      These medications were sent to [facility] [place] - [address]  [address] [place] [address]     Phone:  [phone]     triamcinolone 0.1 % cream         Some of these will need a paper prescription and others can be bought over the counter.  Ask your nurse if you have questions.     Bring a paper prescription for each of these medications     LORazepam 1 MG tablet    sildenafil 100 MG tablet                Primary Care Provider Office Phone # Fax #    Cirilo Landeros [phone]       [facility] [address]  [place] [address]        Equal Access to Services     DEIDRE JANE AH: Hadpriti valdeso Soomaali, waaxda luqadaha, qaybta kaalmada adeegyada, adama scanlon. So [facility] [phone].    ATENCIÓN: Si habla español, tiene a glover disposición servicios gratuitos de asistencia lingüística. Llame al [phone].    We comply with applicable federal civil rights laws and [place] laws. We do not discriminate on the basis of race, color, national origin, age, disability, sex, sexual orientation, or gender identity.            Thank you!     Thank you for choosing [facility]  for your care. Our goal is always to provide you with excellent care. Hearing back from our  patients is one way we can continue to improve our services. Please take a few minutes to complete the written survey that you may receive in the mail after your visit with us. Thank you!             Your Updated Medication List - Protect others around you: Learn how to safely use, store and throw away your medicines at www.disposemymeds.org.          This list is accurate as of 2/7/18  8:09 AM.  Always use your most recent med list.                   Brand Name Dispense Instructions for use Diagnosis    ammonium lactate 12 % lotion    LAC-HYDRIN    360 g    Apply topically 2 times daily as needed for dry skin    Tinea pedis       EPIPEN 0.3 MG/0.3ML injection   Generic drug:  EPINEPHrine      Inject 0.3 mg into the muscle once as needed. For anaphylaxis        ketoconazole 2 % cream    NIZORAL    15 g    Apply topically 2 times daily    Nummular eczematous dermatitis       LORazepam 1 MG tablet    ATIVAN    20 tablet    Take 1 tablet (1 mg) by mouth every 8 hours as needed for anxiety    Situational anxiety       metroNIDAZOLE 0.75 % topical gel    METROGEL    45 g    Apply to face h.s. For red papules    Acne rosacea       sildenafil 100 MG tablet    VIAGRA    6 tablet    Take 1 tablet (100 mg) by mouth daily as needed Take 30 min to 4 hours before intercourse.  Never use with nitroglycerin, terazosin or doxazosin.    Erectile dysfunction due to arterial insufficiency       terbinafine 1 % cream    lamISIL AT    30 g    Apply topically 2 times daily For fungal infection not resolved with other antifungals (e.g. Clotrimazole)    Tinea pedis       triamcinolone 0.1 % cream    KENALOG    30 g    Apply sparingly to affected area three times daily for 14 days.    Nummular eczematous dermatitis

## 2018-02-07 NOTE — PATIENT INSTRUCTIONS
Preventive Health Recommendations  Male Ages 50 - 64    Yearly exam:             See your health care provider every year in order to  o   Review health changes.   o   Discuss preventive care.    o   Review your medicines if your doctor has prescribed any.     Have a cholesterol test every 5 years, or more frequently if you are at risk for high cholesterol/heart disease.     Have a diabetes test (fasting glucose) every three years. If you are at risk for diabetes, you should have this test more often.     Have a colonoscopy at age 50, or have a yearly FIT test (stool test). These exams will check for colon cancer.      Talk with your health care provider about whether or not a prostate cancer screening test (PSA) is right for you.    You should be tested each year for STDs (sexually transmitted diseases), if you re at risk.     Shots: Get a flu shot each year. Get a tetanus shot every 10 years.     Nutrition:    Eat at least 5 servings of fruits and vegetables daily.     Eat whole-grain bread, whole-wheat pasta and brown rice instead of white grains and rice.     Talk to your provider about Calcium and Vitamin D.     Lifestyle    Exercise for at least 150 minutes a week (30 minutes a day, 5 days a week). This will help you control your weight and prevent disease.     Limit alcohol to one drink per day.     No smoking.     Wear sunscreen to prevent skin cancer.     See your dentist every six months for an exam and cleaning.     See your eye doctor every 1 to 2 years.     Ref Range & Units 1d ago   1yr ago         WBC 4.0 - 11.0 10e9/L 3.2 (L) 6.6      RBC Count 4.4 - 5.9 10e12/L 4.53 4.54      Hemoglobin 13.3 - 17.7 g/dL 14.5 14.7      Hematocrit 40.0 - 53.0 % 43.9 44.4      MCV 78 - 100 fl 97 98      MCH 26.5 - 33.0 pg 32.0 32.4      MCHC 31.5 - 36.5 g/dL 33.0 33.1      RDW 10.0 - 15.0 % 12.8 12.9      Platelet Count 150 - 450 10e9/L 202 192      Diff Method  Automated Method Automated Method      % Neutrophils %  42.3 68.7      % Lymphocytes % 42.3 20.2      % Monocytes % 12.9 8.9      % Eosinophils % 0.9 0.9      % Basophils % 1.3 0.8      % Immature Granulocytes % 0.3 0.5      Nucleated RBCs 0 /100 0 0      Absolute Neutrophil 1.6 - 8.3 10e9/L 1.3 (L) 4.6      Absolute Lymphocytes 0.8 - 5.3 10e9/L 1.3 1.3      Absolute Monocytes 0.0 - 1.3 10e9/L 0.4 0.6      Absolute Eosinophils 0.0 - 0.7 10e9/L 0.0 0.1      Absolute Basophils 0.0 - 0.2 10e9/L 0.0 0.1      Abs Immature Granulocytes 0 - 0.4 10e9/L 0.0 0.0      Absolute Nucleated RBC  0.0 0.0     Resulting Agency  [facility]          Specimen Collected: 02/06/18 10:39 AM Last Resulted: 02/06/18 11:17 AM                                 Comprehensive metabolic panel   Status:  Final result   Visible to patient:  No (Not Released) Dx:  hypercholesterolemia; Elevated BP/Pul... Order: [id number]             Ref Range & Units 1d ago   1yr ago        Sodium 133 - 144 mmol/L 140 138      Potassium 3.4 - 5.3 mmol/L 4.5 4.3      Chloride 94 - 109 mmol/L 107 103      Carbon Dioxide 20 - 32 mmol/L 25 26      Anion Gap 3 - 14 mmol/L 8 9      Glucose 70 - 99 mg/dL 72 86CM      Urea Nitrogen 7 - 30 mg/dL 17 17      Creatinine 0.66 - 1.25 mg/dL 0.86 0.89      GFR Estimate >60 mL/min/1.7m2 89 87CM     Comments: Non  GFR Calc      GFR Estimate If Black >60 mL/min/1.7m2 >90 >90  African American GFR Calc      Comments:  GFR Calc      Calcium 8.5 - 10.1 mg/dL 9.2 9.1      Bilirubin Total 0.2 - 1.3 mg/dL 1.1 1.1      Albumin 3.4 - 5.0 g/dL 4.4 4.4      Protein Total 6.8 - 8.8 g/dL 8.0 8.1      Alkaline Phosphatase 40 - 150 U/L 63 67      ALT 0 - 70 U/L 31 26      AST 0 - 45 U/L 24 18     Resulting Agency  [facility]          Specimen Collected: 02/06/18 10:39 AM Last Resulted: 02/06/18 11:07 AM                CM=Additional comments                      PSA, screen   Status:  Final result   Visible to patient:  No (Not Released) Dx:  Nocturia; Family history of malignant...  Order: [id number]             Ref Range & Units 1d ago   1yr ago        PSA 0 - 4 ug/L 1.22 1.93CM     Comments: Assay Method:  Chemiluminescence using Siemens Vista analyzer     Resulting Agency  [facility]          Specimen Collected: 02/06/18 10:39 AM Last Resulted: 02/06/18 11:07 AM                CM=Additional comments                     Lipid Profile (Chol, Trig, HDL, LDL calc)   Status:  Final result   Visible to patient:  No (Not Released) Dx:  hypercholesterolemia Order: [id number]             Ref Range & Units 1d ago   1yr ago        Cholesterol <200 mg/dL 190 187      Triglycerides <150 mg/dL 66 57CM      HDL Cholesterol >39 mg/dL 66 76      LDL Cholesterol Calculated <100 mg/dL 111 (H) 100 (H)CM     Comments: Above desirable:  100-129 mg/dl   Borderline High:  130-159 mg/dL   High:             160-189 mg/dL   Very high:       >189 mg/dl         Non HDL Cholesterol <130 mg/dL 124 111     Resulting [facility]          Specimen Collected: 02/06/18 10:39 AM Last Resulted: 02/06/18 11:07 AM                CM=Additional comments

## 2018-02-07 NOTE — PROGRESS NOTES
SUBJECTIVE:   CC: Zeus Ford is an 62 year old male who presents for preventative health visit.     Healthy Habits:    Do you get at least three servings of calcium containing foods daily (dairy, green leafy vegetables, etc.)? yes    Amount of exercise or daily activities, outside of work: 5 day(s) per week    Problems taking medications regularly No    Medication side effects: No    Have you had an eye exam in the past two years? yes    Do you see a dentist twice per year? yes    Do you have sleep apnea, excessive snoring or daytime drowsiness?no       Hyperlipidemia Follow-Up      Rate your low fat/cholesterol diet?: good    Taking statin?  No    Other lipid medications/supplements?:  none    PSA recheck    Today's PHQ-2 Score:   PHQ-2 ( 1999 [employer]) 7/25/2013   Q1: Little interest or pleasure in doing things 0   Q2: Feeling down, depressed or hopeless 0   PHQ-2 Score 0     PHQ-9 SCORE 2/8/2016 2/9/2017 2/7/2018   Total Score - - -   Total Score 0 0 0     BARRIE-7 SCORE 2/9/2017 2/7/2018   Total Score 2 0            Abuse: Current or Past(Physical, Sexual or Emotional)- No  Do you feel safe in your environment - Yes    Social History   Substance Use Topics     Smoking status: Never Smoker     Smokeless tobacco: Never Used     Alcohol use Yes      Comment: rarely       If you drink alcohol do you typically have >3 drinks per day or >7 drinks per week? No                      Last PSA:   PSA   Date Value Ref Range Status   02/06/2018 1.22 0 - 4 ug/L Final     Comment:     Assay Method:  Chemiluminescence using Siemens Vista analyzer       Reviewed orders with patient. Reviewed health maintenance and updated orders accordingly - Yes  Labs reviewed in EPIC    Reviewed and updated as needed this visit by clinical staff  Tobacco  Allergies  Meds  Med Hx  Surg Hx  Fam Hx  Soc Hx        Reviewed and updated as needed this visit by Provider        Past Medical History:   Diagnosis Date     Elevated BP/Pulse  "-white coat syndrome. 2/6/2012     Family history of colonic polyps 2/7/2011     Family history of malignant neoplasm of prostate 2/7/2011     hypercholesterolemia 2/7/2011     Impaired fasting glucose 2/7/2011     Other male erectile dysfunction 2/8/2016      Past Surgical History:   Procedure Laterality Date     COLONOSCOPY  2005    repeat 2015     COLONOSCOPY N/A 4/9/2015    Repeat in 2025//Procedure: COLONOSCOPY;  Surgeon: Shan Nice MD;  Location: [facility]     HERNIA REPAIR  1970    Hernia, inguinal right       ROS:  C: NEGATIVE for fever, chills, change in weight  I: NEGATIVE for worrisome rashes, moles or lesions  E: NEGATIVE for vision changes or irritation  ENT: NEGATIVE for ear, mouth and throat problems  R: NEGATIVE for significant cough or SOB  CV: NEGATIVE for chest pain, palpitations or peripheral edema  GI: NEGATIVE for nausea, abdominal pain, heartburn, or change in bowel habits   male: negative for dysuria, hematuria, decreased urinary stream, erectile dysfunction, urethral discharge  M: NEGATIVE for significant arthralgias or myalgia  N: NEGATIVE for weakness, dizziness or paresthesias  P: NEGATIVE for changes in mood or affect    OBJECTIVE:   /64  Pulse 74  Temp 97.5  F (36.4  C)  Ht 5' 10\" (1.778 m)  Wt 181 lb (82.1 kg)  BMI 25.97 kg/m2  EXAM:  GENERAL: healthy, alert and no distress  EYES: Eyes grossly normal to inspection, PERRL and conjunctivae and sclerae normal  HENT: ear canals and TM's normal, nose and mouth without ulcers or lesions  NECK: no adenopathy, no asymmetry, masses, or scars and thyroid normal to palpation  RESP: lungs clear to auscultation - no rales, rhonchi or wheezes  CV: regular rate and rhythm, normal S1 S2, no S3 or S4, no murmur, click or rub, no peripheral edema and peripheral pulses strong  ABDOMEN: soft, nontender, no hepatosplenomegaly, no masses and bowel sounds normal   (male): normal male genitalia without lesions or urethral discharge, no " hernia  RECTAL: normal sphincter tone, no rectal masses, prostate normal size, smooth, nontender without nodules or masses  MS: no gross musculoskeletal defects noted, no edema  SKIN: no suspicious lesions or rashes   Irritated 4 mm skin tag on left neck   NEURO: Normal strength and tone, mentation intact and speech normal  PSYCH: mentation appears normal, affect normal/bright  Results for orders placed or performed in visit on 02/06/18 (from the past 24 hour(s))   Lipid Profile (Chol, Trig, HDL, LDL calc)   Result Value Ref Range    Cholesterol 190 <200 mg/dL    Triglycerides 66 <150 mg/dL    HDL Cholesterol 66 >39 mg/dL    LDL Cholesterol Calculated 111 (H) <100 mg/dL    Non HDL Cholesterol 124 <130 mg/dL   Comprehensive metabolic panel   Result Value Ref Range    Sodium 140 133 - 144 mmol/L    Potassium 4.5 3.4 - 5.3 mmol/L    Chloride 107 94 - 109 mmol/L    Carbon Dioxide 25 20 - 32 mmol/L    Anion Gap 8 3 - 14 mmol/L    Glucose 72 70 - 99 mg/dL    Urea Nitrogen 17 7 - 30 mg/dL    Creatinine 0.86 0.66 - 1.25 mg/dL    GFR Estimate 89 >60 mL/min/1.7m2    GFR Estimate If Black >90 >60 mL/min/1.7m2    Calcium 9.2 8.5 - 10.1 mg/dL    Bilirubin Total 1.1 0.2 - 1.3 mg/dL    Albumin 4.4 3.4 - 5.0 g/dL    Protein Total 8.0 6.8 - 8.8 g/dL    Alkaline Phosphatase 63 40 - 150 U/L    ALT 31 0 - 70 U/L    AST 24 0 - 45 U/L   CBC with platelets and differential   Result Value Ref Range    WBC 3.2 (L) 4.0 - 11.0 10e9/L    RBC Count 4.53 4.4 - 5.9 10e12/L    Hemoglobin 14.5 13.3 - 17.7 g/dL    Hematocrit 43.9 40.0 - 53.0 %    MCV 97 78 - 100 fl    MCH 32.0 26.5 - 33.0 pg    MCHC 33.0 31.5 - 36.5 g/dL    RDW 12.8 10.0 - 15.0 %    Platelet Count 202 150 - 450 10e9/L    Diff Method Automated Method     % Neutrophils 42.3 %    % Lymphocytes 42.3 %    % Monocytes 12.9 %    % Eosinophils 0.9 %    % Basophils 1.3 %    % Immature Granulocytes 0.3 %    Nucleated RBCs 0 0 /100    Absolute Neutrophil 1.3 (L) 1.6 - 8.3 10e9/L    Absolute  Lymphocytes 1.3 0.8 - 5.3 10e9/L    Absolute Monocytes 0.4 0.0 - 1.3 10e9/L    Absolute Eosinophils 0.0 0.0 - 0.7 10e9/L    Absolute Basophils 0.0 0.0 - 0.2 10e9/L    Abs Immature Granulocytes 0.0 0 - 0.4 10e9/L    Absolute Nucleated RBC 0.0    PSA, screen   Result Value Ref Range    PSA 1.22 0 - 4 ug/L       ASSESSMENT/PLAN:   1. Situational anxiety  Uses few ativan .  Will rf 20 / year  - LORazepam (ATIVAN) 1 MG tablet; Take 1 tablet (1 mg) by mouth every 8 hours as needed for anxiety  Dispense: 20 tablet; Refill: 0    2. Nummular eczematous dermatitis   RF done.   - triamcinolone (KENALOG) 0.1 % cream; Apply sparingly to affected area three times daily for 14 days.  Dispense: 30 g; Refill: 3    3. Erectile dysfunction due to arterial insufficiency  Stable - meds help Continue current medications and behavioral changes.   - sildenafil (VIAGRA) 100 MG tablet; Take 1 tablet (100 mg) by mouth daily as needed Take 30 min to 4 hours before intercourse.  Never use with nitroglycerin, terazosin or doxazosin.  Dispense: 6 tablet; Refill: 11    4. Routine general medical examination at a health care facility  Healthy - no concerns. Very active     5. hypercholesterolemia  Mild in past. Better with just diet and exercise     6. Family history of malignant neoplasm of prostate  PSA ok     7. Elevated BP/Pulse -white coat syndrome.  Will watch .      Skin tag - left neck- bothers him with his necklace  Removed with tenotomy scissor and pick ups./ No bx done.     COUNSELING:  Reviewed preventive health counseling, as reflected in patient instructions       Regular exercise       Healthy diet/nutrition       Colon cancer screening       Prostate cancer screening    BP Screening:   Last 3 BP Readings:    BP Readings from Last 3 Encounters:   02/07/18 138/70   05/16/17 128/76   05/09/17 137/78       The following was recommended to the patient:  Recommend lifestyle modifications   reports that he has never smoked. He has never  "used smokeless tobacco.    Estimated body mass index is 25.97 kg/(m^2) as calculated from the following:    Height as of this encounter: 5' 10\" (1.778 m).    Weight as of this encounter: 181 lb (82.1 kg).       Counseling Resources:  ATP IV Guidelines  Pooled Cohorts Equation Calculator  FRAX Risk Assessment  ICSI Preventive Guidelines  Dietary Guidelines for Americans, 2010  USDA's MyPlate  ASA Prophylaxis  Lung CA Screening    Cirilo Landeros MD  [facility] [place]  "

## 2018-02-08 ASSESSMENT — PATIENT HEALTH QUESTIONNAIRE - PHQ9: SUM OF ALL RESPONSES TO PHQ QUESTIONS 1-9: 0

## 2018-02-08 ASSESSMENT — ANXIETY QUESTIONNAIRES: GAD7 TOTAL SCORE: 0

## 2018-05-10 ENCOUNTER — TELEPHONE (OUTPATIENT)
Dept: FAMILY MEDICINE | Facility: OTHER | Age: 63
End: 2018-05-10

## 2018-05-10 ENCOUNTER — APPOINTMENT (OUTPATIENT)
Dept: GENERAL RADIOLOGY | Facility: HOSPITAL | Age: 63
End: 2018-05-10
Attending: NURSE PRACTITIONER
Payer: COMMERCIAL

## 2018-05-10 ENCOUNTER — HOSPITAL ENCOUNTER (EMERGENCY)
Facility: HOSPITAL | Age: 63
Discharge: HOME | End: 2018-05-10
Attending: NURSE PRACTITIONER | Admitting: NURSE PRACTITIONER
Payer: COMMERCIAL

## 2018-05-10 VITALS
RESPIRATION RATE: 16 BRPM | OXYGEN SATURATION: 97 % | TEMPERATURE: 98.9 F | DIASTOLIC BLOOD PRESSURE: 96 MMHG | HEIGHT: 72 IN | SYSTOLIC BLOOD PRESSURE: 158 MMHG

## 2018-05-10 DIAGNOSIS — S61.431A PUNCTURE WOUND OF RIGHT HAND WITHOUT FOREIGN BODY, INITIAL ENCOUNTER: ICD-10-CM

## 2018-05-10 LAB
BASOPHILS # BLD AUTO: 0 10E9/L (ref 0–0.2)
BASOPHILS NFR BLD AUTO: 0.6 %
DIFFERENTIAL METHOD BLD: ABNORMAL
EOSINOPHIL # BLD AUTO: 0 10E9/L (ref 0–0.7)
EOSINOPHIL NFR BLD AUTO: 0.4 %
ERYTHROCYTE [DISTWIDTH] IN BLOOD BY AUTOMATED COUNT: 13.1 % (ref 10–15)
HCT VFR BLD AUTO: 43.8 % (ref 40–53)
HGB BLD-MCNC: 15.1 G/DL (ref 13.3–17.7)
IMM GRANULOCYTES # BLD: 0 10E9/L (ref 0–0.4)
IMM GRANULOCYTES NFR BLD: 0.6 %
LYMPHOCYTES # BLD AUTO: 1.3 10E9/L (ref 0.8–5.3)
LYMPHOCYTES NFR BLD AUTO: 25.1 %
MCH RBC QN AUTO: 33.3 PG (ref 26.5–33)
MCHC RBC AUTO-ENTMCNC: 34.5 G/DL (ref 31.5–36.5)
MCV RBC AUTO: 97 FL (ref 78–100)
MONOCYTES # BLD AUTO: 0.5 10E9/L (ref 0–1.3)
MONOCYTES NFR BLD AUTO: 10.1 %
NEUTROPHILS # BLD AUTO: 3.4 10E9/L (ref 1.6–8.3)
NEUTROPHILS NFR BLD AUTO: 63.2 %
NRBC # BLD AUTO: 0 10*3/UL
NRBC BLD AUTO-RTO: 0 /100
PLATELET # BLD AUTO: 222 10E9/L (ref 150–450)
RBC # BLD AUTO: 4.54 10E12/L (ref 4.4–5.9)
WBC # BLD AUTO: 5.3 10E9/L (ref 4–11)

## 2018-05-10 PROCEDURE — 99213 OFFICE O/P EST LOW 20 MIN: CPT | Performed by: NURSE PRACTITIONER

## 2018-05-10 PROCEDURE — 73130 X-RAY EXAM OF HAND: CPT | Mod: TC,RT

## 2018-05-10 PROCEDURE — 85025 COMPLETE CBC W/AUTO DIFF WBC: CPT | Performed by: NURSE PRACTITIONER

## 2018-05-10 PROCEDURE — G0463 HOSPITAL OUTPT CLINIC VISIT: HCPCS

## 2018-05-10 PROCEDURE — 36415 COLL VENOUS BLD VENIPUNCTURE: CPT | Performed by: NURSE PRACTITIONER

## 2018-05-10 RX ORDER — CEPHALEXIN 500 MG/1
500 CAPSULE ORAL 2 TIMES DAILY
Qty: 14 CAPSULE | Refills: 0 | Status: SHIPPED | OUTPATIENT
Start: 2018-05-10 | End: 2019-02-11

## 2018-05-10 ASSESSMENT — ENCOUNTER SYMPTOMS
CONSTITUTIONAL NEGATIVE: 1
JOINT SWELLING: 0

## 2018-05-10 NOTE — DISCHARGE INSTRUCTIONS
Your x-ray showed no fractures and no foreign body.   Your Tdap is up to date.   Take medication as ordered.   Follow up with PCP for re-evaluation in 5-7 days.  Return here if symptoms worsen.     Puncture Wound       A puncture wound occurs when a pointed object pushes into the skin. It may go into the tissues below the skin, including fat and muscle. This type of wound is narrow and deep and can be hard to clean. Because of this, puncture wounds are at high risk for becoming infected.  X-rays may be done to check the wound for objects stuck under the skin. Your may also need a tetanus shot. This is given if you are not up to date on this vaccination and the object that caused the wound may lead to tetanus.  Home care    Your healthcare provider may prescribe an antibiotic. This is to help prevent infection. Follow all instructions for taking this medicine. Take the medicine every day until it is gone or you are told to stop. You should not have any left over.    You can take acetaminophen or ibuprofen for pain, unless you were given a different pain medicine to use.     Keep the wound clean and dry. Do not get the wound wet until you are told it is okay to do so. If the area gets wet, gently pat it dry with a clean cloth. Replace the wet bandage with a dry one.    If a bandage was applied and it becomes wet or dirty, replace it. Otherwise, leave it in place for the first 24 hours.    Once you can get the wound wet, you may shower as usual but do not soak the wound in water (no tub baths or swimming)    Even with proper treatment, a puncture wound may become infected. Check the wound daily for signs of infection listed below.  Follow-up care  Follow up with your healthcare provider as advised.   When to seek medical advice  Call your healthcare provider right away if any of these occur:    Signs of infection, including:  ? Increasing redness or swelling around the wound  ? Increased warmth of the  wound  ? Worsening pain  ? Red streaking lines away from the wound  ? Draining pus    Fever of 100.4 F (38. C) or higher or as directed by your healthcare provider    Wound changes colors    Numbness around the wound    Decreased movement around the injured area

## 2018-05-10 NOTE — TELEPHONE ENCOUNTER
"1:07 PM    Reason for Call: OVERBOOK    Patient is having the following symptoms: wound may be infected.  \"Dr Landeros will probably want to see me\" I told him he did not have any opening today.    The patient is requesting an appointment for (see above).    Was an appointment offered for this call? No  If yes : Appointment type              Date    Preferred method for responding to this message: Telephone Call     What is your phone number [phone]    If we cannot reach you directly, may we leave a detailed response at the number you provided? Yes    Can this message wait until your PCP/provider returns, if unavailable today? Not applicable, provider is in    Laura Espinosa    "

## 2018-05-10 NOTE — ED AVS SNAPSHOT
[facility]    [address] [address]    Phone:  [phone]                                       Zeus Ford   MRN: [id number]    Department:  [facility]   Date of Visit:  5/10/2018           After Visit Summary Signature Page     I have received my discharge instructions, and my questions have been answered. I have discussed any challenges I see with this plan with the nurse or doctor.    ..........................................................................................................................................  Patient/Patient Representative Signature      ..........................................................................................................................................  Patient Representative Print Name and Relationship to Patient    ..................................................               ................................................  Date                                            Time    ..........................................................................................................................................  Reviewed by Signature/Title    ...................................................              ..............................................  Date                                                            Time

## 2018-05-10 NOTE — ED PROVIDER NOTES
"  History     Chief Complaint   Patient presents with     Cellulitis     Pt states was working on boat and got \"stabbed with a wire on my hand\". \"Today it is swollen and red\".     The history is provided by the patient. No  was used.     Zeus Ford is a 62 year old male who presents with a puncture wound to his right hand. Yesterday was working on his boat when his hand was punctured on the palmar and dorsal aspect of his hand.    Is able to fully move the hand. Has pain, redness and swelling at the puncture sites.     Problem List:    Patient Active Problem List    Diagnosis Date Noted     Nummular eczematous dermatitis 05/09/2017     Priority: Medium     ACP (advance care planning) 02/09/2017     Priority: Medium     Advance Care Planning 2/9/2017: ACP Review of Chart / Resources Provided:  Reviewed chart for advance care plan.  Zeus Ford has no plan or code status on file. Discussed available resources and provided with information. .  Added by Red Law             Nocturia 02/08/2016     Priority: Medium     Other male erectile dysfunction 02/08/2016     Priority: Medium     Elevated prostate specific antigen (PSA) 02/11/2015     Priority: Medium     Advanced care planning/counseling discussion 02/04/2013     Priority: Medium     Elevated BP/Pulse -white coat syndrome. 02/06/2012     Priority: Medium     hypercholesterolemia 02/07/2011     Priority: Medium     Family history of malignant neoplasm of prostate 02/07/2011     Priority: Medium     Family history of colonic polyps 02/07/2011     Priority: Medium        Past Medical History:    Past Medical History:   Diagnosis Date     Elevated BP/Pulse -white coat syndrome. 2/6/2012     Family history of colonic polyps 2/7/2011     Family history of malignant neoplasm of prostate 2/7/2011     hypercholesterolemia 2/7/2011     Impaired fasting glucose 2/7/2011     Other male erectile dysfunction 2/8/2016       Past Surgical History: "    Past Surgical History:   Procedure Laterality Date     COLONOSCOPY  2005    repeat 2015     COLONOSCOPY N/A 4/9/2015    Repeat in 2025//Procedure: COLONOSCOPY;  Surgeon: Shan Nice MD;  Location: [facility]     HERNIA REPAIR  1970    Hernia, inguinal right       Family History:    Family History   Problem Relation Age of Onset     CANCER Father      pancreatic       Social History:  Marital Status:   [2]  Social History   Substance Use Topics     Smoking status: Never Smoker     Smokeless tobacco: Never Used     Alcohol use Yes      Comment: rarely         Medications:      cephALEXin (KEFLEX) 500 MG capsule   ammonium lactate (LAC-HYDRIN) 12 % lotion   EPINEPHrine (EPIPEN) 0.3 MG/0.3ML injection   ketoconazole (NIZORAL) 2 % cream   LORazepam (ATIVAN) 1 MG tablet   metroNIDAZOLE (METROGEL) 0.75 % topical gel   sildenafil (VIAGRA) 100 MG tablet   terbinafine (LAMISIL AT) 1 % cream   triamcinolone (KENALOG) 0.1 % cream         Review of Systems   Constitutional: Negative.    Musculoskeletal: Negative for joint swelling.        Right hand redness, pain and swelling surrounding the puncture wounds       Physical Exam   BP: 158/96  Heart Rate: 95  Temp: 98.9  F (37.2  C)  Resp: 16  Height: 182.9 cm (6')  SpO2: 97 %      Physical Exam   Constitutional: He is oriented to person, place, and time. He appears well-developed and well-nourished. No distress.   Cardiovascular: Normal rate.    Pulmonary/Chest: Effort normal.   Musculoskeletal:        Right hand: He exhibits tenderness, laceration (2 mm closed puncture wound to palmar and dorsal aspect of hand, surrounded by 3 cm of erythema and swelling) and swelling. He exhibits normal range of motion, normal capillary refill and no deformity. Normal sensation noted. Normal strength noted.        Hands:  Neurological: He is alert and oriented to person, place, and time.   Skin: He is not diaphoretic.   Nursing note and vitals reviewed.      ED Course     ED Course      Procedures    Results for orders placed or performed during the hospital encounter of 05/10/18 (from the past 24 hour(s))   CBC with platelets differential   Result Value Ref Range    WBC 5.3 4.0 - 11.0 10e9/L    RBC Count 4.54 4.4 - 5.9 10e12/L    Hemoglobin 15.1 13.3 - 17.7 g/dL    Hematocrit 43.8 40.0 - 53.0 %    MCV 97 78 - 100 fl    MCH 33.3 (H) 26.5 - 33.0 pg    MCHC 34.5 31.5 - 36.5 g/dL    RDW 13.1 10.0 - 15.0 %    Platelet Count 222 150 - 450 10e9/L    Diff Method Automated Method     % Neutrophils 63.2 %    % Lymphocytes 25.1 %    % Monocytes 10.1 %    % Eosinophils 0.4 %    % Basophils 0.6 %    % Immature Granulocytes 0.6 %    Nucleated RBCs 0 0 /100    Absolute Neutrophil 3.4 1.6 - 8.3 10e9/L    Absolute Lymphocytes 1.3 0.8 - 5.3 10e9/L    Absolute Monocytes 0.5 0.0 - 1.3 10e9/L    Absolute Eosinophils 0.0 0.0 - 0.7 10e9/L    Absolute Basophils 0.0 0.0 - 0.2 10e9/L    Abs Immature Granulocytes 0.0 0 - 0.4 10e9/L    Absolute Nucleated RBC 0.0    XR Hand Right G/E 3 Views    Narrative    XR HAND RT G/E 3 VW    HISTORY: 62 yearsMale Possible FB or fracture 5th metacarpal;     TECHNIQUE: Right hand 3 views    COMPARISON: None    FINDINGS: The fifth metacarpal is intact. There is no evidence of  fracture. No foreign body is evident.    There is degenerative change at the first carpometacarpal  articulation.      Impression    IMPRESSION: No evidence of fracture or dislocation. There is no  evidence of metallic foreign body.    MAXI HOLM MD       Medications - No data to display    Assessments & Plan (with Medical Decision Making)     I have reviewed the nursing notes.  I have reviewed the findings, diagnosis, plan and need for follow up with the patient.  Tdap is UTD.  WBC wnl and XR is negative.   Soaked hand in warm water and hibaclens.  Applied abx ointment and a bandage.     Will start on Keflex d/t dirty injury and increased pain and redness.   Given Epic educational material.   Advised to  f/u early next week for re-evaluation with PCP.   Return here if sx worsen.     Discharge Medication List as of 5/10/2018  3:03 PM      START taking these medications    Details   cephALEXin (KEFLEX) 500 MG capsule Take 1 capsule (500 mg) by mouth 2 times daily for 7 days, Disp-14 capsule, R-0, E-Prescribe             Final diagnoses:   Puncture wound of right hand without foreign body, initial encounter       5/10/2018   [facility]     Gianna Marte NP  05/10/18 [id number]

## 2018-05-10 NOTE — ED NOTES
Patient presents with puncture wound to Rt hand with swelling and redness X yesterday.  Last TDAP 2016.  Patient states he punctured hand with a piece of wire.

## 2018-10-15 ENCOUNTER — ALLIED HEALTH/NURSE VISIT (OUTPATIENT)
Dept: FAMILY MEDICINE | Facility: OTHER | Age: 63
End: 2018-10-15
Attending: FAMILY MEDICINE
Payer: COMMERCIAL

## 2018-10-15 DIAGNOSIS — Z23 NEED FOR PROPHYLACTIC VACCINATION AND INOCULATION AGAINST INFLUENZA: Primary | ICD-10-CM

## 2018-10-15 PROCEDURE — 90471 IMMUNIZATION ADMIN: CPT

## 2018-10-15 PROCEDURE — 90682 RIV4 VACC RECOMBINANT DNA IM: CPT

## 2018-10-15 NOTE — MR AVS SNAPSHOT
"              After Visit Summary   10/15/2018    Zeus Ford    MRN: [id number]           Patient Information     Date Of Birth          1955        Visit Information        Provider Department      10/15/2018 4:50 PM HC FLU SHOT CLINIC [facility] [place]        Today's Diagnoses     Need for prophylactic vaccination and inoculation against influenza    -  1       Follow-ups after your visit        Your next 10 appointments already scheduled     Oct 15, 2018  4:50 PM CDT   (Arrive by 4:35 PM)   Flu Shot with HC FLU SHOT CLINIC   [facility] [place] ([facility] [place] )    [address]  [place] [address]   [phone]              Who to contact     If you have questions or need follow up information about today's clinic visit or your schedule please contact [facility] directly at [phone].  Normal or non-critical lab and imaging results will be communicated to you by MyChart, letter or phone within 4 business days after the clinic has received the results. If you do not hear from us within 7 days, please contact the clinic through MyChart or phone. If you have a critical or abnormal lab result, we will notify you by phone as soon as possible.  Submit refill requests through [employer] or call your pharmacy and they will forward the refill request to us. Please allow 3 business days for your refill to be completed.          Additional Information About Your Visit        MyChart Information     [employer] lets you send messages to your doctor, view your test results, renew your prescriptions, schedule appointments and more. To sign up, go to [email or web address] . Click on \"Log in\" on the left side of the screen, which will take you to the Welcome page. Then click on \"Sign up Now\" on the right side of the page.     You will be asked to enter the access code listed below, as well as some personal information. Please follow the directions " to create your username and password.     Your access code is: [email or web address]  Expires: 2019  4:23 PM     Your access code will  in 90 days. If you need help or a new code, please call your [facility] clinic or [phone].        Care EveryWhere ID     This is your Care EveryWhere ID. This could be used by other organizations to access your [facility] medical records  [id number]         Blood Pressure from Last 3 Encounters:   05/10/18 158/96   18 138/70   17 128/76    Weight from Last 3 Encounters:   18 181 lb (82.1 kg)   17 190 lb (86.2 kg)   17 190 lb 6.4 oz (86.4 kg)              We Performed the Following     C RIV4 (FLUBLOK) VACCINE RECOMBINANT DNA PRSRV ANTIBIO FREE, IM     Vaccine Administration, Initial [25862]        Primary Care Provider Office Phone # Fax #    Cirilo Landeros [phone] [address] [id number]        Equal Access to Services     [facility]: Hadii aad ku hadasho Soallisonali, waaxda luqadaha, qaybta kaalmada kamari, adama linares . So [facility] [phone].    ATENCIÓN: Si habla español, tiene a glover disposición servicios gratuitos de asistencia lingüística. [facility] [phone].    We comply with applicable federal civil rights laws and [place] laws. We do not discriminate on the basis of race, color, national origin, age, disability, sex, sexual orientation, or gender identity.            Thank you!     Thank you for choosing [facility]  for your care. Our goal is always to provide you with excellent care. Hearing back from our patients is one way we can continue to improve our services. Please take a few minutes to complete the written survey that you may receive in the mail after your visit with us. Thank you!             Your Updated Medication List - Protect others around you: Learn how to safely use, store and throw away your medicines at  www.disposemymeds.org.          This list is accurate as of 10/15/18  4:35 PM.  Always use your most recent med list.                   Brand Name Dispense Instructions for use Diagnosis    ammonium lactate 12 % lotion    LAC-HYDRIN    360 g    Apply topically 2 times daily as needed for dry skin    Tinea pedis       EPIPEN 0.3 MG/0.3ML injection   Generic drug:  EPINEPHrine      Inject 0.3 mg into the muscle once as needed. For anaphylaxis        ketoconazole 2 % cream    NIZORAL    15 g    Apply topically 2 times daily    Nummular eczematous dermatitis       LORazepam 1 MG tablet    ATIVAN    20 tablet    Take 1 tablet (1 mg) by mouth every 8 hours as needed for anxiety    Situational anxiety       metroNIDAZOLE 0.75 % topical gel    METROGEL    45 g    Apply to face h.s. For red papules    Acne rosacea       sildenafil 100 MG tablet    VIAGRA    6 tablet    Take 1 tablet (100 mg) by mouth daily as needed Take 30 min to 4 hours before intercourse.  Never use with nitroglycerin, terazosin or doxazosin.    Erectile dysfunction due to arterial insufficiency       terbinafine 1 % cream    lamISIL AT    30 g    Apply topically 2 times daily For fungal infection not resolved with other antifungals (e.g. Clotrimazole)    Tinea pedis       triamcinolone 0.1 % cream    KENALOG    30 g    Apply sparingly to affected area three times daily for 14 days.    Nummular eczematous dermatitis

## 2018-10-15 NOTE — PROGRESS NOTES
Injectable Influenza Immunization Documentation    1.  Is the person to be vaccinated sick today?   No    2. Does the person to be vaccinated have an allergy to a component   of the vaccine?   No  Egg Allergy Algorithm Link    3. Has the person to be vaccinated ever had a serious reaction   to influenza vaccine in the past?   No    4. Has the person to be vaccinated ever had Guillain-Barré syndrome?   No  Prior to injection verified patient identity using patient's name and date of birth.      Form completed by Rita Tadeo LPN

## 2018-11-12 ENCOUNTER — TELEPHONE (OUTPATIENT)
Dept: FAMILY MEDICINE | Facility: OTHER | Age: 63
End: 2018-11-12

## 2018-11-12 NOTE — TELEPHONE ENCOUNTER
To: Dr. Landeros nurse  Patient has physical on 2/11/19 and is requesting that his lab orders be set.  He plans to come in for labs the Friday before (02/08/2019) Thank you

## 2019-02-04 NOTE — PROGRESS NOTES
SUBJECTIVE:   CC: Zeus Ford is an 63 year old male who presents for preventive health visit.     Healthy Habits:    Do you get at least three servings of calcium containing foods daily (dairy, green leafy vegetables, etc.)? yes    Amount of exercise or daily activities, outside of work: 5 day(s) per week    Problems taking medications regularly No    Medication side effects: No    Have you had an eye exam in the past two years? yes    Do you see a dentist twice per year? yes    Do you have sleep apnea, excessive snoring or daytime drowsiness?no      Hyperlipidemia Follow-Up      Rate your low fat/cholesterol diet?: good    Taking statin?  No    Other lipid medications/supplements?:  none      Today's PHQ-2 Score:   PHQ-2 ( 1999 Pfizer) 7/25/2013   Q1: Little interest or pleasure in doing things 0   Q2: Feeling down, depressed or hopeless 0   PHQ-2 Score 0     PHQ-9 SCORE 2/9/2017 2/7/2018 2/11/2019   PHQ-9 Total Score - - -   PHQ-9 Total Score 0 0 0     BARRIE-7 SCORE 2/9/2017 2/7/2018 2/11/2019   Total Score 2 0 0           Abuse: Current or Past(Physical, Sexual or Emotional)- No  Do you feel safe in your environment? Yes    Social History     Tobacco Use     Smoking status: Never Smoker     Smokeless tobacco: Never Used   Substance Use Topics     Alcohol use: Yes     Comment: rarely      If you drink alcohol do you typically have >3 drinks per day or >7 drinks per week? No                      Last PSA:   PSA   Date Value Ref Range Status   02/06/2018 1.22 0 - 4 ug/L Final     Comment:     Assay Method:  Chemiluminescence using Siemens Vista analyzer       Reviewed orders with patient. Reviewed health maintenance and updated orders accordingly - Yes  Labs reviewed in EPIC    Reviewed and updated as needed this visit by clinical staff         Reviewed and updated as needed this visit by Provider        Past Medical History:   Diagnosis Date     Elevated BP/Pulse -white coat syndrome. 2/6/2012     Family history  "of colonic polyps 2/7/2011     Family history of malignant neoplasm of prostate 2/7/2011     hypercholesterolemia 2/7/2011     Impaired fasting glucose 2/7/2011     Other male erectile dysfunction 2/8/2016      Past Surgical History:   Procedure Laterality Date     COLONOSCOPY  2005    repeat 2015     COLONOSCOPY N/A 4/9/2015    Repeat in 2025//Procedure: COLONOSCOPY;  Surgeon: Shan Nice MD;  Location: [facility]     HERNIA REPAIR  1970    Hernia, inguinal right       ROS:  CONSTITUTIONAL: NEGATIVE for fever, chills, change in weight  INTEGUMENTARY/SKIN: NEGATIVE for worrisome rashes, moles or lesions  EYES: NEGATIVE for vision changes or irritation  ENT: NEGATIVE for ear, mouth and throat problems  RESP: NEGATIVE for significant cough or SOB  CV: NEGATIVE for chest pain, palpitations or peripheral edema  GI: NEGATIVE for nausea, abdominal pain, heartburn, or change in bowel habits   male: negative for dysuria, hematuria, decreased urinary stream, erectile dysfunction, urethral discharge  MUSCULOSKELETAL: NEGATIVE for significant arthralgias or myalgia  NEURO: NEGATIVE for weakness, dizziness or paresthesias  PSYCHIATRIC: NEGATIVE for changes in mood or affect    OBJECTIVE:   /62   Pulse 108   Temp 97.7  F (36.5  C)   Ht 1.816 m (5' 11.5\")   Wt 86.2 kg (190 lb)   SpO2 98%   BMI 26.13 kg/m    EXAM:  GENERAL: healthy, alert and no distress  EYES: Eyes grossly normal to inspection, PERRL and conjunctivae and sclerae normal  HENT: ear canals and TM's normal, nose and mouth without ulcers or lesions  NECK: no adenopathy, no asymmetry, masses, or scars and thyroid normal to palpation  RESP: lungs clear to auscultation - no rales, rhonchi or wheezes  CV: regular rate and rhythm, normal S1 S2, no S3 or S4, no murmur, click or rub, no peripheral edema and peripheral pulses strong  ABDOMEN: soft, nontender, no hepatosplenomegaly, no masses and bowel sounds normal   (male): deferred -- pt later came back and " asked for exam  Exam done and unremarkable   RECTAL: normal sphincter tone, no rectal masses, prostate normal size, smooth, nontender without nodules or masses  MS: no gross musculoskeletal defects noted, no edema  SKIN: no suspicious lesions or rashes  NEURO: Normal strength and tone, mentation intact and speech normal  PSYCH: mentation appears normal, affect normal/bright    Results for orders placed or performed in visit on 02/08/19   PSA Diagnostic   Result Value Ref Range    PSA 2.88 0 - 4 ug/L   Comprehensive metabolic panel   Result Value Ref Range    Sodium 140 133 - 144 mmol/L    Potassium 4.0 3.4 - 5.3 mmol/L    Chloride 107 94 - 109 mmol/L    Carbon Dioxide 22 20 - 32 mmol/L    Anion Gap 11 3 - 14 mmol/L    Glucose 84 70 - 99 mg/dL    Urea Nitrogen 15 7 - 30 mg/dL    Creatinine 0.85 0.66 - 1.25 mg/dL    GFR Estimate >90 >60 mL/min/[1.73_m2]    GFR Estimate If Black >90 >60 mL/min/[1.73_m2]    Calcium 8.9 8.5 - 10.1 mg/dL    Bilirubin Total 0.8 0.2 - 1.3 mg/dL    Albumin 4.1 3.4 - 5.0 g/dL    Protein Total 7.8 6.8 - 8.8 g/dL    Alkaline Phosphatase 60 40 - 150 U/L    ALT 34 0 - 70 U/L    AST 21 0 - 45 U/L   CBC with platelets differential   Result Value Ref Range    WBC 3.8 (L) 4.0 - 11.0 10e9/L    RBC Count 4.51 4.4 - 5.9 10e12/L    Hemoglobin 14.7 13.3 - 17.7 g/dL    Hematocrit 43.9 40.0 - 53.0 %    MCV 97 78 - 100 fl    MCH 32.6 26.5 - 33.0 pg    MCHC 33.5 31.5 - 36.5 g/dL    RDW 12.7 10.0 - 15.0 %    Platelet Count 214 150 - 450 10e9/L    Diff Method Automated Method     % Neutrophils 55.0 %    % Lymphocytes 31.9 %    % Monocytes 11.3 %    % Eosinophils 0.5 %    % Basophils 1.0 %    % Immature Granulocytes 0.3 %    Nucleated RBCs 0 0 /100    Absolute Neutrophil 2.1 1.6 - 8.3 10e9/L    Absolute Lymphocytes 1.2 0.8 - 5.3 10e9/L    Absolute Monocytes 0.4 0.0 - 1.3 10e9/L    Absolute Eosinophils 0.0 0.0 - 0.7 10e9/L    Absolute Basophils 0.0 0.0 - 0.2 10e9/L    Abs Immature Granulocytes 0.0 0 - 0.4 10e9/L     Absolute Nucleated RBC 0.0    Lipid Profile   Result Value Ref Range    Cholesterol 151 <200 mg/dL    Triglycerides 67 <150 mg/dL    HDL Cholesterol 41 >39 mg/dL    LDL Cholesterol Calculated 97 <100 mg/dL    Non HDL Cholesterol 110 <130 mg/dL         ASSESSMENT/PLAN:   1. Routine general medical examination at a health care facility  Overall doing well. Very active in intermediate. See in a year  - Lipid Profile; Future  - CBC with platelets differential; Future  - Comprehensive metabolic panel; Future  - PSA Diagnostic; Future    2. Need for vaccination  Shot given  - 1st  Administration  [68478]  - SHINGRIX [81318]    3. Situational anxiety  Much improved with intermediate . Still needs prn ativan   - LORazepam (ATIVAN) 1 MG tablet; Take 1 tablet (1 mg) by mouth every 8 hours as needed for anxiety  Dispense: 20 tablet; Refill: 0    4. Erectile dysfunction due to arterial insufficiency  Stable - RF needed   - sildenafil (VIAGRA) 100 MG tablet; Take 1 tablet (100 mg) by mouth daily as needed Take 30 min to 4 hours before intercourse.  Never use with nitroglycerin, terazosin or doxazosin.  Dispense: 6 tablet; Refill: 11    5. Nummular eczematous dermatitis  Stable on cream   - triamcinolone (KENALOG) 0.1 % external cream; Apply sparingly to affected area three times daily for 14 days.  Dispense: 30 g; Refill: 3    COUNSELING:  Reviewed preventive health counseling, as reflected in patient instructions       Regular exercise       Healthy diet/nutrition       Colon cancer screening       Prostate cancer screening    BP Readings from Last 1 Encounters:   05/10/18 158/96     Estimated body mass index is 24.55 kg/m  as calculated from the following:    Height as of 5/10/18: 1.829 m (6').    Weight as of 2/7/18: 82.1 kg (181 lb).           reports that  has never smoked. he has never used smokeless tobacco.      Counseling Resources:  ATP IV Guidelines  Pooled Cohorts Equation Calculator  FRAX Risk Assessment  ICSI  Preventive Guidelines  Dietary Guidelines for Americans, 2010  USDA's MyPlate  ASA Prophylaxis  Lung CA Screening    Cirilo Landeros MD  [facility]

## 2019-02-04 NOTE — PATIENT INSTRUCTIONS
Preventive Health Recommendations  Male Ages 50 - 64    Yearly exam:             See your health care provider every year in order to  o   Review health changes.   o   Discuss preventive care.    o   Review your medicines if your doctor has prescribed any.     Have a cholesterol test every 5 years, or more frequently if you are at risk for high cholesterol/heart disease.     Have a diabetes test (fasting glucose) every three years. If you are at risk for diabetes, you should have this test more often.     Have a colonoscopy at age 50, or have a yearly FIT test (stool test). These exams will check for colon cancer.      Talk with your health care provider about whether or not a prostate cancer screening test (PSA) is right for you.    You should be tested each year for STDs (sexually transmitted diseases), if you re at risk.     Shots: Get a flu shot each year. Get a tetanus shot every 10 years.     Nutrition:    Eat at least 5 servings of fruits and vegetables daily.     Eat whole-grain bread, whole-wheat pasta and brown rice instead of white grains and rice.     Get adequate Calcium and Vitamin D.     Lifestyle    Exercise for at least 150 minutes a week (30 minutes a day, 5 days a week). This will help you control your weight and prevent disease.     Limit alcohol to one drink per day.     No smoking.     Wear sunscreen to prevent skin cancer.     See your dentist every six months for an exam and cleaning.     See your eye doctor every 1 to 2 years.  PSA Diagnostic   Order: [id number]   Status:  Final result   Visible to patient:  No (Not Released) Dx:  Routine general medical examination a...    Ref Range & Units 3d ago  (2/8/19) 1yr ago  (2/6/18) 2yr ago  (2/7/17) 3yr ago  (2/8/16) 3yr ago  (5/14/15) 4yr ago  (2/11/15) 5yr ago  (2/10/14)   PSA 0 - 4 ug/L 2.88  1.22 CM 1.93 CM 1.41  1.49  3.82  1.94    Comment: Assay Method:  Chemiluminescence using Siemens Vista analyzer   [facility]         Specimen Collected: 02/08/19 10:15 AM Last Resulted: 02/08/19  2:10 PM         Lab Flowsheet       Order Details       View Encounter       Lab and Collection Details       Routing       Result History         CM=Additional comments           Comprehensive metabolic panel   Order: [id number]   Status:  Final result   Visible to patient:  No (Inaccessible in MyChart) Dx:  Routine general medical examination a...    Ref Range & Units 3d ago 1yr ago 2yr ago 3yr ago 4yr ago 5yr ago   Sodium 133 - 144 mmol/L 140  140  138  140  139  134 Abnormally low  R   Potassium 3.4 - 5.3 mmol/L 4.0  4.5  4.3  4.1  4.0  4.2 R   Chloride 94 - 109 mmol/L 107  107  103  107  107  101 R   Carbon Dioxide 20 - 32 mmol/L 22  25  26  23  25  27 R   Anion Gap 3 - 14 mmol/L 11  8  9  10  7  6.5 R   Glucose 70 - 99 mg/dL 84  72  86 CM 96  111 Abnormally high  CM 99 R   Urea Nitrogen 7 - 30 mg/dL 15  17  17  18  17 CM 19 R   Creatinine 0.66 - 1.25 mg/dL 0.85  0.86  0.89  0.78  0.90  0.94 R   GFR Estimate >60 mL/min/ >90  89 R, CM 87 R, CM >90   Non  GFR Calc  R    86 R, CM 82 R   Comment: Non  GFR Calc   Starting 12/18/2018, serum creatinine based estimated GFR (eGFR) will be   calculated using the Chronic Kidney Disease Epidemiology Collaboration   (CKD-EPI) equation.    GFR Estimate If Black >60 mL/min/ >90  >90 R, CM >90    GFR Calc  R    >90    GFR Calc  R    >90    GFR Calc  R    >90 R   Comment:  GFR Calc   Starting 12/18/2018, serum creatinine based estimated GFR (eGFR) will be   calculated using the Chronic Kidney Disease Epidemiology Collaboration   (CKD-EPI) equation.    Calcium 8.5 - 10.1 mg/dL 8.9  9.2  9.1  8.6  9.2 CM 9.0 R   Bilirubin Total 0.2 - 1.3 mg/dL 0.8  1.1  1.1  1.0  1.1  1.1 Abnormally high  R   Albumin 3.4 - 5.0 g/dL 4.1  4.4  4.4  4.3  4.3  4.5    Protein Total 6.8 - 8.8 g/dL 7.8  8.0   8.1  7.7  7.9  8.2 R   Alkaline Phosphatase 40 - 150 U/L 60  63  67  58  61  84 R   ALT 0 - 70 U/L 34  31  26  30  27  29 R   AST 0 - 45 U/L 21  24  18  22  21  19 R   [facility]         Specimen Collected: 02/08/19 10:15 AM Last Resulted: 02/08/19  2:10 PM         Lab Flowsheet       Order Details       View Encounter       Lab and Collection Details       Routing       Result History         CM=Additional comments  R=Reference range differs from displayed range           Lipid Profile   Order: [id number]   Status:  Final result   Visible to patient:  No (Inaccessible in [facility]) Dx:  Routine general medical examination a...    Ref Range & Units 3d ago 1yr ago 2yr ago 3yr ago 4yr ago 5yr ago   Cholesterol <200 mg/dL 151  190  187  174  180  R, CM   Triglycerides <150 mg/dL 67  66  57 CM 46 CM 69 R 27 R   HDL Cholesterol >39 mg/dL 41  66  76  56  56 R 57 R   LDL Cholesterol Calculated <100 mg/dL 97  111 Abnormally high   Abnormally high   Abnormally high   R,  R, CM   Comment: Desirable:       <100 mg/dl   Non HDL Cholesterol <130 mg/dL 110  124  111  118      [facility]  [facility]         Specimen Collected: 02/08/19 10:15 AM Last Resulted: 02/08/19  2:10 PM         Lab Flowsheet       Order Details       View Encounter       Lab and Collection Details       Routing       Result History         CM=Additional comments  R=Reference range differs from displayed range           Contains abnormal data CBC with platelets differential   Order: [id number]   Status:  Final result   Visible to patient:  No (Inaccessible in [facility]) Dx:  Routine general medical examination a...    Ref Range & Units 3d ago 9mo ago   WBC 4.0 - 11.0 10e9/L 3.8 Abnormally low   5.3    RBC Count 4.4 - 5.9 10e12/L 4.51  4.54    Hemoglobin 13.3 - 17.7 g/dL 14.7  15.1    Hematocrit 40.0 - 53.0 % 43.9  43.8    MCV 78 - 100 fl 97  97    MCH 26.5 - 33.0 pg 32.6  33.3 Abnormally high      MCHC 31.5 - 36.5 g/dL 33.5  34.5    RDW 10.0 - 15.0 % 12.7  13.1    Platelet Count 150 - 450 10e9/L 214  222    Diff Method  Automated Method  Automated Method    % Neutrophils % 55.0  63.2    % Lymphocytes % 31.9  25.1    % Monocytes % 11.3  10.1    % Eosinophils % 0.5  0.4    % Basophils % 1.0  0.6    % Immature Granulocytes % 0.3  0.6    Nucleated RBCs 0 /100 0  0    Absolute Neutrophil 1.6 - 8.3 10e9/L 2.1  3.4    Absolute Lymphocytes 0.8 - 5.3 10e9/L 1.2  1.3    Absolute Monocytes 0.0 - 1.3 10e9/L 0.4  0.5    Absolute Eosinophils 0.0 - 0.7 10e9/L 0.0  0.0    Absolute Basophils 0.0 - 0.2 10e9/L 0.0  0.0    Abs Immature Granulocytes 0 - 0.4 10e9/L 0.0  0.0    Absolute Nucleated RBC  0.0  0.0    [facility]         Specimen Collected: 02/08/19 10:15 AM Last Resulted: 02/08/19  1:49 PM         Lab Flowsheet       Order Details       View Encounter       Lab and Collection Details       Routing       Result History

## 2019-02-08 DIAGNOSIS — Z00.00 ROUTINE GENERAL MEDICAL EXAMINATION AT A HEALTH CARE FACILITY: ICD-10-CM

## 2019-02-08 LAB
ALBUMIN SERPL-MCNC: 4.1 G/DL (ref 3.4–5)
ALP SERPL-CCNC: 60 U/L (ref 40–150)
ALT SERPL W P-5'-P-CCNC: 34 U/L (ref 0–70)
ANION GAP SERPL CALCULATED.3IONS-SCNC: 11 MMOL/L (ref 3–14)
AST SERPL W P-5'-P-CCNC: 21 U/L (ref 0–45)
BASOPHILS # BLD AUTO: 0 10E9/L (ref 0–0.2)
BASOPHILS NFR BLD AUTO: 1 %
BILIRUB SERPL-MCNC: 0.8 MG/DL (ref 0.2–1.3)
BUN SERPL-MCNC: 15 MG/DL (ref 7–30)
CALCIUM SERPL-MCNC: 8.9 MG/DL (ref 8.5–10.1)
CHLORIDE SERPL-SCNC: 107 MMOL/L (ref 94–109)
CHOLEST SERPL-MCNC: 151 MG/DL
CO2 SERPL-SCNC: 22 MMOL/L (ref 20–32)
CREAT SERPL-MCNC: 0.85 MG/DL (ref 0.66–1.25)
DIFFERENTIAL METHOD BLD: ABNORMAL
EOSINOPHIL # BLD AUTO: 0 10E9/L (ref 0–0.7)
EOSINOPHIL NFR BLD AUTO: 0.5 %
ERYTHROCYTE [DISTWIDTH] IN BLOOD BY AUTOMATED COUNT: 12.7 % (ref 10–15)
GFR SERPL CREATININE-BSD FRML MDRD: >90 ML/MIN/{1.73_M2}
GLUCOSE SERPL-MCNC: 84 MG/DL (ref 70–99)
HCT VFR BLD AUTO: 43.9 % (ref 40–53)
HDLC SERPL-MCNC: 41 MG/DL
HGB BLD-MCNC: 14.7 G/DL (ref 13.3–17.7)
IMM GRANULOCYTES # BLD: 0 10E9/L (ref 0–0.4)
IMM GRANULOCYTES NFR BLD: 0.3 %
LDLC SERPL CALC-MCNC: 97 MG/DL
LYMPHOCYTES # BLD AUTO: 1.2 10E9/L (ref 0.8–5.3)
LYMPHOCYTES NFR BLD AUTO: 31.9 %
MCH RBC QN AUTO: 32.6 PG (ref 26.5–33)
MCHC RBC AUTO-ENTMCNC: 33.5 G/DL (ref 31.5–36.5)
MCV RBC AUTO: 97 FL (ref 78–100)
MONOCYTES # BLD AUTO: 0.4 10E9/L (ref 0–1.3)
MONOCYTES NFR BLD AUTO: 11.3 %
NEUTROPHILS # BLD AUTO: 2.1 10E9/L (ref 1.6–8.3)
NEUTROPHILS NFR BLD AUTO: 55 %
NONHDLC SERPL-MCNC: 110 MG/DL
NRBC # BLD AUTO: 0 10*3/UL
NRBC BLD AUTO-RTO: 0 /100
PLATELET # BLD AUTO: 214 10E9/L (ref 150–450)
POTASSIUM SERPL-SCNC: 4 MMOL/L (ref 3.4–5.3)
PROT SERPL-MCNC: 7.8 G/DL (ref 6.8–8.8)
PSA SERPL-MCNC: 2.88 UG/L (ref 0–4)
RBC # BLD AUTO: 4.51 10E12/L (ref 4.4–5.9)
SODIUM SERPL-SCNC: 140 MMOL/L (ref 133–144)
TRIGL SERPL-MCNC: 67 MG/DL
WBC # BLD AUTO: 3.8 10E9/L (ref 4–11)

## 2019-02-08 PROCEDURE — 84153 ASSAY OF PSA TOTAL: CPT | Performed by: FAMILY MEDICINE

## 2019-02-08 PROCEDURE — 80053 COMPREHEN METABOLIC PANEL: CPT | Performed by: FAMILY MEDICINE

## 2019-02-08 PROCEDURE — 85025 COMPLETE CBC W/AUTO DIFF WBC: CPT | Performed by: FAMILY MEDICINE

## 2019-02-08 PROCEDURE — 36415 COLL VENOUS BLD VENIPUNCTURE: CPT | Performed by: FAMILY MEDICINE

## 2019-02-08 PROCEDURE — 80061 LIPID PANEL: CPT | Performed by: FAMILY MEDICINE

## 2019-02-11 ENCOUNTER — OFFICE VISIT (OUTPATIENT)
Dept: FAMILY MEDICINE | Facility: OTHER | Age: 64
End: 2019-02-11
Attending: FAMILY MEDICINE
Payer: COMMERCIAL

## 2019-02-11 VITALS
SYSTOLIC BLOOD PRESSURE: 124 MMHG | OXYGEN SATURATION: 98 % | DIASTOLIC BLOOD PRESSURE: 62 MMHG | BODY MASS INDEX: 25.73 KG/M2 | WEIGHT: 190 LBS | HEART RATE: 108 BPM | TEMPERATURE: 97.7 F | HEIGHT: 72 IN

## 2019-02-11 DIAGNOSIS — F41.8 SITUATIONAL ANXIETY: ICD-10-CM

## 2019-02-11 DIAGNOSIS — Z23 NEED FOR VACCINATION: ICD-10-CM

## 2019-02-11 DIAGNOSIS — N52.01 ERECTILE DYSFUNCTION DUE TO ARTERIAL INSUFFICIENCY: ICD-10-CM

## 2019-02-11 DIAGNOSIS — L30.0 NUMMULAR ECZEMATOUS DERMATITIS: ICD-10-CM

## 2019-02-11 DIAGNOSIS — Z00.00 ROUTINE GENERAL MEDICAL EXAMINATION AT A HEALTH CARE FACILITY: Primary | ICD-10-CM

## 2019-02-11 PROCEDURE — 90471 IMMUNIZATION ADMIN: CPT | Performed by: FAMILY MEDICINE

## 2019-02-11 PROCEDURE — 99396 PREV VISIT EST AGE 40-64: CPT | Mod: 25 | Performed by: FAMILY MEDICINE

## 2019-02-11 PROCEDURE — 90750 HZV VACC RECOMBINANT IM: CPT | Performed by: FAMILY MEDICINE

## 2019-02-11 RX ORDER — TRIAMCINOLONE ACETONIDE 1 MG/G
CREAM TOPICAL
Qty: 30 G | Refills: 3 | Status: SHIPPED | OUTPATIENT
Start: 2019-02-11 | End: 2020-02-10

## 2019-02-11 RX ORDER — LORAZEPAM 1 MG/1
1 TABLET ORAL EVERY 8 HOURS PRN
Qty: 20 TABLET | Refills: 0 | Status: SHIPPED | OUTPATIENT
Start: 2019-02-11 | End: 2020-02-10

## 2019-02-11 RX ORDER — SILDENAFIL 100 MG/1
100 TABLET, FILM COATED ORAL DAILY PRN
Qty: 6 TABLET | Refills: 11 | Status: SHIPPED | OUTPATIENT
Start: 2019-02-11 | End: 2020-02-10

## 2019-02-11 ASSESSMENT — ANXIETY QUESTIONNAIRES
6. BECOMING EASILY ANNOYED OR IRRITABLE: NOT AT ALL
2. NOT BEING ABLE TO STOP OR CONTROL WORRYING: NOT AT ALL
5. BEING SO RESTLESS THAT IT IS HARD TO SIT STILL: NOT AT ALL
7. FEELING AFRAID AS IF SOMETHING AWFUL MIGHT HAPPEN: NOT AT ALL
3. WORRYING TOO MUCH ABOUT DIFFERENT THINGS: NOT AT ALL
1. FEELING NERVOUS, ANXIOUS, OR ON EDGE: NOT AT ALL
4. TROUBLE RELAXING: NOT AT ALL
GAD7 TOTAL SCORE: 0

## 2019-02-11 ASSESSMENT — MIFFLIN-ST. JEOR: SCORE: 1686.89

## 2019-02-11 ASSESSMENT — PAIN SCALES - GENERAL: PAINLEVEL: NO PAIN (0)

## 2019-02-11 ASSESSMENT — PATIENT HEALTH QUESTIONNAIRE - PHQ9: SUM OF ALL RESPONSES TO PHQ QUESTIONS 1-9: 0

## 2019-02-11 NOTE — NURSING NOTE
"Chief Complaint   Patient presents with     Physical       Initial /68   Pulse 108   Temp 97.7  F (36.5  C)   Ht 1.816 m (5' 11.5\")   Wt 86.2 kg (190 lb)   SpO2 98%   BMI 26.13 kg/m   Estimated body mass index is 26.13 kg/m  as calculated from the following:    Height as of this encounter: 1.816 m (5' 11.5\").    Weight as of this encounter: 86.2 kg (190 lb).  Medication Reconciliation: complete    Red Law LPN  "

## 2019-02-12 ASSESSMENT — ANXIETY QUESTIONNAIRES: GAD7 TOTAL SCORE: 0

## 2019-02-13 ENCOUNTER — OFFICE VISIT (OUTPATIENT)
Dept: FAMILY MEDICINE | Facility: OTHER | Age: 64
End: 2019-02-13
Attending: FAMILY MEDICINE
Payer: COMMERCIAL

## 2019-02-13 ENCOUNTER — TELEPHONE (OUTPATIENT)
Dept: FAMILY MEDICINE | Facility: OTHER | Age: 64
End: 2019-02-13

## 2019-02-13 DIAGNOSIS — Z00.00 ROUTINE GENERAL MEDICAL EXAMINATION AT A HEALTH CARE FACILITY: Primary | ICD-10-CM

## 2019-02-13 NOTE — NURSING NOTE
"Chief Complaint   Patient presents with     Consult       Initial There were no vitals taken for this visit. Estimated body mass index is 26.13 kg/m  as calculated from the following:    Height as of 2/11/19: 1.816 m (5' 11.5\").    Weight as of 2/11/19: 86.2 kg (190 lb).     Patient refused all vitals.     Medication Reconciliation: complete    Claudia Quezada LPN  "

## 2019-02-13 NOTE — TELEPHONE ENCOUNTER
7:53 AM    Reason for Call: Phone Call    Description: Patient called specifically for PCP nurse.  Attempted to reach nurse at desk but she was not available.  Offered more help yet patient just asked that she give him a call back.    Was an appointment offered for this call? No  If yes : Appointment type              Date    Preferred method for responding to this message: Telephone Call  What is your phone number ?[phone]    If we cannot reach you directly, may we leave a detailed response at the number you provided? No    Can this message wait until your PCP/provider returns, if available today? YES    Andie Cortés LPN

## 2019-02-13 NOTE — PROGRESS NOTES
Patient presents for concern he did not bring up at the time of physical. Dr. Landeros notified and will speak with patient.     Claudia Quezada LPN on 2/13/2019 at 8:41 AM

## 2019-02-13 NOTE — TELEPHONE ENCOUNTER
Patient called stating when he had his visit there was a test that was not completed. Patient requesting a prostate exam. Requesting to come in today and have Dr. Landeros do this exam asap. Patient instructed to come in when he can.     Claudia Quezada LPN on 2/13/2019 at 8:13 AM

## 2019-09-18 ENCOUNTER — OFFICE VISIT (OUTPATIENT)
Dept: FAMILY MEDICINE | Facility: OTHER | Age: 64
End: 2019-09-18
Attending: FAMILY MEDICINE
Payer: COMMERCIAL

## 2019-09-18 ENCOUNTER — NURSE TRIAGE (OUTPATIENT)
Dept: FAMILY MEDICINE | Facility: OTHER | Age: 64
End: 2019-09-18

## 2019-09-18 VITALS
OXYGEN SATURATION: 97 % | HEIGHT: 72 IN | HEART RATE: 105 BPM | DIASTOLIC BLOOD PRESSURE: 72 MMHG | SYSTOLIC BLOOD PRESSURE: 164 MMHG | WEIGHT: 191 LBS | BODY MASS INDEX: 25.87 KG/M2 | TEMPERATURE: 98.3 F

## 2019-09-18 DIAGNOSIS — J06.9 VIRAL UPPER RESPIRATORY TRACT INFECTION: Primary | ICD-10-CM

## 2019-09-18 DIAGNOSIS — F43.21 GRIEF REACTION: ICD-10-CM

## 2019-09-18 DIAGNOSIS — R03.0 ELEVATED BLOOD PRESSURE READING WITHOUT DIAGNOSIS OF HYPERTENSION: ICD-10-CM
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
## 2019-09-18 DIAGNOSIS — H61.23 CERUMINOSIS, BILATERAL: ICD-10-CM

## 2019-09-18 PROCEDURE — 69209 REMOVE IMPACTED EAR WAX UNI: CPT | Performed by: FAMILY MEDICINE

## 2019-09-18 PROCEDURE — 99214 OFFICE O/P EST MOD 30 MIN: CPT | Mod: 25 | Performed by: FAMILY MEDICINE

## 2019-09-18 ASSESSMENT — MIFFLIN-ST. JEOR: SCORE: 1686.43

## 2019-09-18 ASSESSMENT — PAIN SCALES - GENERAL: PAINLEVEL: NO PAIN (0)

## 2019-09-18 NOTE — TELEPHONE ENCOUNTER
"Patient stated he was bit by a deer tick about 2 weeks ago. Patient stated that he has been having a slight headache as well as a stiff sore neck. Patient reports very slight pink spot where the tick was attached under his right arm. Patient is concerned of lyme's disease. Per protocol, patient was scheduled an appointment.     Additional Information    Negative: Not a tick bite    Negative: Patient sounds very sick or weak to the triager    Negative: Fever or severe headache occurs, 2 to 14 days following the bite    Negative: Widespread rash occurs, 2 to 14 days following the bite    Negative: Can't remove live tick (after using Care Advice)    Negative: Can't remove tick's head that was broken off in the skin (after using Care Advice)    Negative: Fever and area is red    Negative: Fever and area is very tender to touch    Negative: Red streak or red line and length > 2 inches (5 cm)    Negative: Red ring or bull's-eye rash occurs around a deer tick bite    Negative: Probable deer tick that was attached > 36 hours (or tick appears swollen, not flat)    Patient wants to be seen    Answer Assessment - Initial Assessment Questions  1. TYPE of TICK: \"Is it a wood tick or a deer tick?\" If unsure, ask: \"What size was the tick?\" \"Did it look more like a watermelon seed or a poppy seed?\"       Patient stated it was a deer tick   2. LOCATION: \"Where is the tick bite located?\"       Under the right arm   3. ONSET: \"How long do you think the tick was attached before you removed it?\" (Hours or days)       At the most a day  4. TETANUS: \"When was the last tetanus booster?\"       A few years ago  5. PREGNANCY: \"Is there any chance you are pregnant?\" \"When was your last menstrual period?\"      no    Protocols used: TICK BITE-A-[place]      "

## 2019-09-18 NOTE — PROGRESS NOTES
"Subjective     Zeus Ford is a 64 year old male who presents to clinic today for the following health issues:    HPI   RESPIRATORY SYMPTOMS      Duration:     Description  sore throat, cough, headache and myalgias    Severity: moderate    Accompanying signs and symptoms: None    History (predisposing factors):  none    Precipitating or alleviating factors: None    Therapies tried and outcome:  None    Sx getting better - main reason is below         Tick bite      Duration: Labor day weekend    Description (location/character/radiation): a deer tick- was stuck in skin-- \"was a deer tick for sure\"    Intensity:  moderate    Accompanying signs and symptoms: none    History (similar episodes/previous evaluation): None    Precipitating or alleviating factors: None    Therapies tried and outcome: None    quess that tick on 12-24 hrs    No trenching night sweats- sometimes warm    No ar thralgias    Mild HA       Pt is anxious because going to [place] oct 2nd       Mother  yesterday   Increase anxiety of this and travel and all the above       Reviewed and updated as needed this visit by Provider         Review of Systems   ROS COMP: CONSTITUTIONAL: NEGATIVE for fever, chills, change in weight  ENT/MOUTH: NEGATIVE for ear, mouth and throat problems  RESP: NEGATIVE for significant cough or SOB      Objective    BP (!) 174/84   Pulse 105   Temp 98.3  F (36.8  C)   Ht 1.816 m (5' 11.5\")   Wt 86.6 kg (191 lb)   SpO2 97%   BMI 26.27 kg/m    Body mass index is 26.27 kg/m .  Physical Exam   GENERAL: healthy, alert and mild  Distress- anxious   HENT: ear canals and large wax in both ears l, nose and mouth without ulcers or lesions  NECK: no adenopathy, no asymmetry, masses, or scars and thyroid normal to palpation  RESP: lungs clear to auscultation - no rales, rhonchi or wheezes  SKIN: no suspicious lesions or rashes            Assessment & Plan     1. Viral upper respiratory tract infection  Clinically " "improving he says. Symptomatic treatment was discussed along what is available for OTC medications for symptomatic relief.     2. Tick bite, initial encounter  Discussed. Lower risk for tick borne dz-- offered testing - would not treat. Right now he will monitor for sx- I stated should have more or sx since bit 2 weeks ago    3. Ceruminosis, bilateral  Ear wash done by nursing with good results     4. Elevated BP/Pulse -white coat syndrome.  Has ativan - going to  home after this. Nice discussion on his stress level right now.      5. Grief reaction  Condolence given - mother lived very healthy long life        BMI:   Estimated body mass index is 26.27 kg/m  as calculated from the following:    Height as of this encounter: 1.816 m (5' 11.5\").    Weight as of this encounter: 86.6 kg (191 lb).               No follow-ups on file.    Cirilo Landeros MD  [facility] - [place]      "

## 2019-09-18 NOTE — NURSING NOTE
"Chief Complaint   Patient presents with     Insect Bites       Initial BP (!) 174/84   Pulse 105   Temp 98.3  F (36.8  C)   Ht 1.816 m (5' 11.5\")   Wt 86.6 kg (191 lb)   SpO2 97%   BMI 26.27 kg/m   Estimated body mass index is 26.27 kg/m  as calculated from the following:    Height as of this encounter: 1.816 m (5' 11.5\").    Weight as of this encounter: 86.6 kg (191 lb).  Medication Reconciliation: complete  Red Law LPN  "

## 2019-11-06 ENCOUNTER — IMMUNIZATION (OUTPATIENT)
Dept: FAMILY MEDICINE | Facility: OTHER | Age: 64
End: 2019-11-06
Attending: FAMILY MEDICINE
Payer: COMMERCIAL

## 2019-11-06 DIAGNOSIS — Z23 NEED FOR PROPHYLACTIC VACCINATION AND INOCULATION AGAINST INFLUENZA: Primary | ICD-10-CM

## 2019-11-06 PROCEDURE — 90682 RIV4 VACC RECOMBINANT DNA IM: CPT

## 2019-11-06 PROCEDURE — 90471 IMMUNIZATION ADMIN: CPT

## 2020-01-30 ENCOUNTER — OFFICE VISIT (OUTPATIENT)
Dept: FAMILY MEDICINE | Facility: OTHER | Age: 65
End: 2020-01-30
Attending: FAMILY MEDICINE
Payer: COMMERCIAL

## 2020-01-30 VITALS
WEIGHT: 186.2 LBS | SYSTOLIC BLOOD PRESSURE: 162 MMHG | BODY MASS INDEX: 25.61 KG/M2 | DIASTOLIC BLOOD PRESSURE: 82 MMHG | RESPIRATION RATE: 20 BRPM | TEMPERATURE: 97.5 F | HEART RATE: 104 BPM | OXYGEN SATURATION: 98 %

## 2020-01-30 DIAGNOSIS — R03.0 ELEVATED BLOOD PRESSURE READING WITHOUT DIAGNOSIS OF HYPERTENSION: ICD-10-CM

## 2020-01-30 DIAGNOSIS — H92.02 OTALGIA, LEFT: Primary | ICD-10-CM

## 2020-01-30 PROCEDURE — 99213 OFFICE O/P EST LOW 20 MIN: CPT | Performed by: FAMILY MEDICINE

## 2020-01-30 RX ORDER — NEOMYCIN SULFATE, POLYMYXIN B SULFATE, HYDROCORTISONE 3.5; 10000; 1 MG/ML; [USP'U]/ML; MG/ML
3 SOLUTION/ DROPS AURICULAR (OTIC) 3 TIMES DAILY
Qty: 10 ML | Refills: 0 | Status: SHIPPED | OUTPATIENT
Start: 2020-01-30 | End: 2020-02-10

## 2020-01-30 ASSESSMENT — PAIN SCALES - GENERAL: PAINLEVEL: NO PAIN (0)

## 2020-01-30 NOTE — NURSING NOTE
"Chief Complaint   Patient presents with     Otalgia       Initial BP (!) 170/92 (BP Location: Right arm, Patient Position: Sitting, Cuff Size: Adult Regular)   Pulse 104   Temp 97.5  F (36.4  C) (Tympanic)   Resp 20   Wt 84.5 kg (186 lb 3.2 oz)   SpO2 98%   BMI 25.61 kg/m   Estimated body mass index is 25.61 kg/m  as calculated from the following:    Height as of 9/18/19: 1.816 m (5' 11.5\").    Weight as of this encounter: 84.5 kg (186 lb 3.2 oz).  Medication Reconciliation: complete  Dion Gonzalez LPN  "

## 2020-01-30 NOTE — PROGRESS NOTES
Subjective     Zeus Ford is a 64 year old male who presents to clinic today for the following health issues:    HPI   RESPIRATORY SYMPTOMS      Duration: 2 days    Description  nasal congestion, rhinorrhea, ear pain left and headache    Severity: mild    Accompanying signs and symptoms: None    History (predisposing factors):  none    Precipitating or alleviating factors: None    Therapies tried and outcome:  None    2 days of itching in left ear, some mild irritation/pain noted this morning    Patient concerned about infection, will be snowmobiling and doesn't want any irritation with helmet    No fevers, no cough, maybe slight congestion    No drainage from ears    Uses q-tips, a lot of wax, often needs ear cleaning in clinic - last in september    No trauma      Elevated blood pressure  Usually in low 130's/80's - checks every day to two days  Felt rushed getting here, always has elevated blood pressure in office        Current Outpatient Medications   Medication Sig Dispense Refill     ammonium lactate (LAC-HYDRIN) 12 % lotion Apply topically 2 times daily as needed for dry skin 360 g 3     ketoconazole (NIZORAL) 2 % cream Apply topically 2 times daily 15 g 1     LORazepam (ATIVAN) 1 MG tablet Take 1 tablet (1 mg) by mouth every 8 hours as needed for anxiety 20 tablet 0     metroNIDAZOLE (METROGEL) 0.75 % topical gel Apply to face h.s. For red papules 45 g 3     sildenafil (VIAGRA) 100 MG tablet Take 1 tablet (100 mg) by mouth daily as needed Take 30 min to 4 hours before intercourse.  Never use with nitroglycerin, terazosin or doxazosin. 6 tablet 11     triamcinolone (KENALOG) 0.1 % external cream Apply sparingly to affected area three times daily for 14 days. 30 g 3     EPINEPHrine (EPIPEN) 0.3 MG/0.3ML injection Inject 0.3 mg into the muscle once as needed. For anaphylaxis       terbinafine (LAMISIL AT) 1 % cream Apply topically 2 times daily For fungal infection not resolved with other antifungals  (e.g. Clotrimazole) (Patient not taking: Reported on 1/30/2020) 30 g 1     Allergies   Allergen Reactions     Bactrim [Sulfamethoxazole W-Trimethoprim] Rash     Sulfa Drugs Rash         Reviewed and updated as needed this visit by Provider         Review of Systems   ROS COMP: Constitutional, HEENT, cardiovascular, pulmonary, gi and gu systems are negative, except as otherwise noted.      Objective    BP (!) 162/82 (BP Location: Right arm, Patient Position: Sitting, Cuff Size: Adult Regular)   Pulse 104   Temp 97.5  F (36.4  C) (Tympanic)   Resp 20   Wt 84.5 kg (186 lb 3.2 oz)   SpO2 98%   BMI 25.61 kg/m    Body mass index is 25.61 kg/m .  Physical Exam   GENERAL: healthy, alert and no distress  HENT: ear canals and TM's normal - minimal cerumen present, nose and mouth without ulcers or lesions  NECK: no adenopathy, no asymmetry, masses, or scars and thyroid normal to palpation  RESP: lungs clear to auscultation - no rales, rhonchi or wheezes  CV: tachycardic with regular rhythm, normal S1 S2, no S3 or S4, no murmur, click or rub, no peripheral edema and peripheral pulses strong    Diagnostic Test Results:  none         Assessment & Plan       ICD-10-CM    1. Otalgia, left H92.02 neomycin-polymyxin-hydrocortisone (CORTISPORIN) 3.5-40578-3 otic solution   2. Elevated BP/Pulse -white coat syndrome. R03.0      Patient concerned about infection/itching. Reassured that there are no signs of infection on exam. Will give patient paper script for cortisporin. Told patient to hold onto script and told him if symptoms worsen over the next couple day he can fill script. Elevated blood pressure likely secondary to white coat syndrome. Patient regularly checking blood pressure at home. Encouraged to continue monitoring.      Candelario MARIN , saw and examined the patient in the presence of Dr. Tigre Pop, MS3       I was present with the medical student who participated in the service and in the  documentation of the note. I have verified the history and personally performed the physical exam and medical decision making. I agree with the assessment and plan of care as documented in the note.     Cirilo Landeros MD      No follow-ups on file.    Cirilo Landeros MD  [facility]

## 2020-02-03 NOTE — PROGRESS NOTES
3  SUBJECTIVE:   CC: Zeus Ford is an 64 year old male who presents for preventive health visit.     Healthy Habits:    Do you get at least three servings of calcium containing foods daily (dairy, green leafy vegetables, etc.)? yes    Amount of exercise or daily activities, outside of work: 5 day(s) per week    Problems taking medications regularly No    Medication side effects: No    Have you had an eye exam in the past two years? no    Do you see a dentist twice per year? yes    Do you have sleep apnea, excessive snoring or daytime drowsiness?no      Hyperlipidemia Follow-Up      Are you regularly taking any medication or supplement to lower your cholesterol?   No    Are you having muscle aches or other side effects that you think could be caused by your cholesterol lowering medication?  No    Hypertension Follow-up      Do you check your blood pressure regularly outside of the clinic? Yes     Are you following a low salt diet? Yes    Are your blood pressures ever more than 140 on the top number (systolic) OR more   than 90 on the bottom number (diastolic), for example 140/90? Yes     BP ALWAYS HIGH HERE-- CHECK ITS AT HOME AND ALWAYS UNDER 140/90      Today's PHQ-2 Score:   PHQ-2 ( 1999 [employer]) 2/13/2019 7/25/2013   Q1: Little interest or pleasure in doing things 0 0   Q2: Feeling down, depressed or hopeless 0 0   PHQ-2 Score 0 0       Abuse: Current or Past(Physical, Sexual or Emotional)- No  Do you feel safe in your environment? Yes        Social History     Tobacco Use     Smoking status: Never Smoker     Smokeless tobacco: Never Used   Substance Use Topics     Alcohol use: Yes     Comment: rarely      If you drink alcohol do you typically have >3 drinks per day or >7 drinks per week? No                      Last PSA:   PSA   Date Value Ref Range Status   02/08/2019 2.88 0 - 4 ug/L Final     Comment:     Assay Method:  Chemiluminescence using Siemens Vista analyzer       Reviewed orders with patient.  "Reviewed health maintenance and updated orders accordingly - Yes  Labs reviewed in EPIC    Reviewed and updated as needed this visit by clinical staff         Reviewed and updated as needed this visit by Provider        Past Medical History:   Diagnosis Date     Elevated BP/Pulse -white coat syndrome. 2/6/2012     Family history of colonic polyps 2/7/2011     Family history of malignant neoplasm of prostate 2/7/2011     hypercholesterolemia 2/7/2011     Impaired fasting glucose 2/7/2011     Other male erectile dysfunction 2/8/2016      Past Surgical History:   Procedure Laterality Date     COLONOSCOPY  2005    repeat 2015     COLONOSCOPY N/A 4/9/2015    Repeat in 2025//Procedure: COLONOSCOPY;  Surgeon: Shan Nice MD;  Location: [facility] OR     HERNIA REPAIR  1970    Hernia, inguinal right       ROS:  CONSTITUTIONAL: NEGATIVE for fever, chills, change in weight  INTEGUMENTARY/SKIN: NEGATIVE for worrisome rashes, moles or lesions  EYES: NEGATIVE for vision changes or irritation  ENT: NEGATIVE for ear, mouth and throat problems  RESP: NEGATIVE for significant cough or SOB  CV: NEGATIVE for chest pain, palpitations or peripheral edema  GI: NEGATIVE for nausea, abdominal pain, heartburn, or change in bowel habits   male: negative for dysuria, hematuria, decreased urinary stream, erectile dysfunction, urethral discharge  MUSCULOSKELETAL: NEGATIVE for significant arthralgias or myalgia  NEURO: NEGATIVE for weakness, dizziness or paresthesias  PSYCHIATRIC: NEGATIVE for changes in mood or affect    OBJECTIVE:   BP (!) 146/86   Pulse 90   Temp 97.6  F (36.4  C)   Ht 1.778 m (5' 10\")   Wt 86.2 kg (190 lb)   SpO2 98%   BMI 27.26 kg/m    EXAM:  GENERAL: healthy, alert and no distress- very anxious in clinic always   EYES: Eyes grossly normal to inspection, PERRL and conjunctivae and sclerae normal  HENT: ear canals and TM's normal, nose and mouth without ulcers or lesions  NECK: no adenopathy, no asymmetry, masses, or " scars and thyroid normal to palpation  RESP: lungs clear to auscultation - no rales, rhonchi or wheezes  CV: regular rate and rhythm, normal S1 S2, no S3 or S4, no murmur, click or rub, no peripheral edema and peripheral pulses strong  ABDOMEN: soft, nontender, no hepatosplenomegaly, no masses and bowel sounds normal   (male): normal male genitalia without lesions or urethral discharge, no hernia  MS: no gross musculoskeletal defects noted, no edema  SKIN: no suspicious lesions or rashes-- mild eczema   NEURO: Normal strength and tone, mentation intact and speech normal  PSYCH: mentation appears normal, affect normal/bright  LYMPH: no cervical, supraclavicular, axillary, or inguinal adenopathy    No results found for this or any previous visit (from the past 48 hour(s)).    Orders Only on 02/07/2020   Component Date Value Ref Range Status     WBC 02/07/2020 5.2  4.0 - 11.0 10e9/L Final     RBC Count 02/07/2020 4.68  4.4 - 5.9 10e12/L Final     Hemoglobin 02/07/2020 15.4  13.3 - 17.7 g/dL Final     Hematocrit 02/07/2020 45.4  40.0 - 53.0 % Final     MCV 02/07/2020 97  78 - 100 fl Final     MCH 02/07/2020 32.9  26.5 - 33.0 pg Final     MCHC 02/07/2020 33.9  31.5 - 36.5 g/dL Final     RDW 02/07/2020 12.8  10.0 - 15.0 % Final     Platelet Count 02/07/2020 214  150 - 450 10e9/L Final     Diff Method 02/07/2020 Automated Method   Final     % Neutrophils 02/07/2020 60.1  % Final     % Lymphocytes 02/07/2020 26.7  % Final     % Monocytes 02/07/2020 11.0  % Final     % Eosinophils 02/07/2020 0.6  % Final     % Basophils 02/07/2020 1.0  % Final     % Immature Granulocytes 02/07/2020 0.6  % Final     Nucleated RBCs 02/07/2020 0  0 /100 Final     Absolute Neutrophil 02/07/2020 3.1  1.6 - 8.3 10e9/L Final     Absolute Lymphocytes 02/07/2020 1.4  0.8 - 5.3 10e9/L Final     Absolute Monocytes 02/07/2020 0.6  0.0 - 1.3 10e9/L Final     Absolute Eosinophils 02/07/2020 0.0  0.0 - 0.7 10e9/L Final     Absolute Basophils 02/07/2020  0.1  0.0 - 0.2 10e9/L Final     Abs Immature Granulocytes 02/07/2020 0.0  0 - 0.4 10e9/L Final     Absolute Nucleated RBC 02/07/2020 0.0   Final     Sodium 02/07/2020 137  133 - 144 mmol/L Final     Potassium 02/07/2020 4.3  3.4 - 5.3 mmol/L Final     Chloride 02/07/2020 105  94 - 109 mmol/L Final     Carbon Dioxide 02/07/2020 25  20 - 32 mmol/L Final     Anion Gap 02/07/2020 7  3 - 14 mmol/L Final     Glucose 02/07/2020 94  70 - 99 mg/dL Final     Urea Nitrogen 02/07/2020 22  7 - 30 mg/dL Final     Creatinine 02/07/2020 0.94  0.66 - 1.25 mg/dL Final     GFR Estimate 02/07/2020 85  >60 mL/min/[1.73_m2] Final    Comment: Non  GFR Calc  Starting 12/18/2018, serum creatinine based estimated GFR (eGFR) will be   calculated using the Chronic Kidney Disease Epidemiology Collaboration   (CKD-EPI) equation.       GFR Estimate If Black 02/07/2020 >90  >60 mL/min/[1.73_m2] Final    Comment:  GFR Calc  Starting 12/18/2018, serum creatinine based estimated GFR (eGFR) will be   calculated using the Chronic Kidney Disease Epidemiology Collaboration   (CKD-EPI) equation.       Calcium 02/07/2020 9.2  8.5 - 10.1 mg/dL Final     Bilirubin Total 02/07/2020 1.0  0.2 - 1.3 mg/dL Final     Albumin 02/07/2020 4.4  3.4 - 5.0 g/dL Final     Protein Total 02/07/2020 7.9  6.8 - 8.8 g/dL Final     Alkaline Phosphatase 02/07/2020 66  40 - 150 U/L Final     ALT 02/07/2020 27  0 - 70 U/L Final     AST 02/07/2020 14  0 - 45 U/L Final     Cholesterol 02/07/2020 210* <200 mg/dL Final    Desirable:       <200 mg/dl     Triglycerides 02/07/2020 62  <150 mg/dL Final     HDL Cholesterol 02/07/2020 68  >39 mg/dL Final     LDL Cholesterol Calculated 02/07/2020 130* <100 mg/dL Final    Comment: Above desirable:  100-129 mg/dl  Borderline High:  130-159 mg/dL  High:             160-189 mg/dL  Very high:       >189 mg/dl       Non HDL Cholesterol 02/07/2020 142* <130 mg/dL Final    Comment: Above Desirable:  130-159  mg/dl  Borderline high:  160-189 mg/dl  High:             190-219 mg/dl  Very high:       >219 mg/dl       PSA 02/07/2020 2.04  0 - 4 ug/L Final    Assay Method:  Chemiluminescence using Siemens Vista analyzer           ASSESSMENT/PLAN:   1. Routine general medical examination at a health care facility  Overall active and healthy    2. Elevated BP/Pulse -white coat syndrome.  In past and present. Readings at home ok     3. Elevated prostate specific antigen (PSA)  In past - better today     4. hypercholesterolemia  Stable / mild elevation this year.     5. Nocturia  Mild /psa ok /will watch     6. Situational anxiety  Uses 20  Ativan a year - Rf done   - LORazepam (ATIVAN) 1 MG tablet; Take 1 tablet (1 mg) by mouth every 8 hours as needed for anxiety  Dispense: 20 tablet; Refill: 0    7. Erectile dysfunction due to arterial insufficiency  RF done - Continue current medications and behavioral changes.   - sildenafil (VIAGRA) 100 MG tablet; Take 1 tablet (100 mg) by mouth daily as needed Take 30 min to 4 hours before intercourse.  Never use with nitroglycerin, terazosin or doxazosin.  Dispense: 6 tablet; Refill: 11    8. Nummular eczematous dermatitis  RF done - Continue current medications and behavioral changes.   - triamcinolone (KENALOG) 0.1 % external cream; Apply sparingly to affected area three times daily for 14 days.  Dispense: 30 g; Refill: 3    9. Need for vaccination  Shot given  - SHINGRIX [26796]  - 1st  Administration  [45995]    10. Tinea pedis of both feet  Refill done - uses prn for flares   - terbinafine (LAMISIL AT) 1 % external cream; Apply topically 2 times daily For fungal infection not resolved with other antifungals (e.g. Clotrimazole)  Dispense: 30 g; Refill: 1    11. Bee sting reaction, accidental or unintentional, sequela  RF done.   - EPINEPHrine (EPIPEN/ADRENACLICK/OR ANY BX GENERIC EQUIV) 0.3 MG/0.3ML injection 2-pack; Inject 0.3 mLs (0.3 mg) into the muscle as needed for anaphylaxis   "Dispense: 2 each; Refill: 1    12. Cough  RF - uses sparingly through year when gets URI and bad cough   - guaiFENesin-codeine (ROBITUSSIN AC) 100-10 MG/5ML solution; Take 5-10 mLs by mouth every 4 hours as needed for cough  Dispense: 180 mL; Refill: 0    COUNSELING:  Reviewed preventive health counseling, as reflected in patient instructions       Regular exercise       Healthy diet/nutrition    Estimated body mass index is 25.61 kg/m  as calculated from the following:    Height as of 9/18/19: 1.816 m (5' 11.5\").    Weight as of 1/30/20: 84.5 kg (186 lb 3.2 oz).    45 minutes was spent with patient and over 50%  of this time was spent on counseling patient regarding  illness, medication and / or treatment  options, coordinating further cares and follow ups that are needed along with resource material that will be helpful in the treatment of these issues.        reports that he has never smoked. He has never used smokeless tobacco.      Counseling Resources:  ATP IV Guidelines  Pooled Cohorts Equation Calculator  FRAX Risk Assessment  ICSI Preventive Guidelines  Dietary Guidelines for Americans, 2010  USDA's MyPlate  ASA Prophylaxis  Lung CA Screening    Cirilo Landeros MD  [facility] - [place]  "

## 2020-02-07 DIAGNOSIS — R97.20 ELEVATED PROSTATE SPECIFIC ANTIGEN (PSA): ICD-10-CM

## 2020-02-07 DIAGNOSIS — Z00.00 ROUTINE GENERAL MEDICAL EXAMINATION AT A HEALTH CARE FACILITY: ICD-10-CM

## 2020-02-07 DIAGNOSIS — R35.1 NOCTURIA: Primary | ICD-10-CM

## 2020-02-07 DIAGNOSIS — E78.00 HYPERCHOLESTEROLEMIA: ICD-10-CM

## 2020-02-07 LAB
ALBUMIN SERPL-MCNC: 4.4 G/DL (ref 3.4–5)
ALP SERPL-CCNC: 66 U/L (ref 40–150)
ALT SERPL W P-5'-P-CCNC: 27 U/L (ref 0–70)
ANION GAP SERPL CALCULATED.3IONS-SCNC: 7 MMOL/L (ref 3–14)
AST SERPL W P-5'-P-CCNC: 14 U/L (ref 0–45)
BASOPHILS # BLD AUTO: 0.1 10E9/L (ref 0–0.2)
BASOPHILS NFR BLD AUTO: 1 %
BILIRUB SERPL-MCNC: 1 MG/DL (ref 0.2–1.3)
BUN SERPL-MCNC: 22 MG/DL (ref 7–30)
CALCIUM SERPL-MCNC: 9.2 MG/DL (ref 8.5–10.1)
CHLORIDE SERPL-SCNC: 105 MMOL/L (ref 94–109)
CHOLEST SERPL-MCNC: 210 MG/DL
CO2 SERPL-SCNC: 25 MMOL/L (ref 20–32)
CREAT SERPL-MCNC: 0.94 MG/DL (ref 0.66–1.25)
DIFFERENTIAL METHOD BLD: NORMAL
EOSINOPHIL # BLD AUTO: 0 10E9/L (ref 0–0.7)
EOSINOPHIL NFR BLD AUTO: 0.6 %
ERYTHROCYTE [DISTWIDTH] IN BLOOD BY AUTOMATED COUNT: 12.8 % (ref 10–15)
GFR SERPL CREATININE-BSD FRML MDRD: 85 ML/MIN/{1.73_M2}
GLUCOSE SERPL-MCNC: 94 MG/DL (ref 70–99)
HCT VFR BLD AUTO: 45.4 % (ref 40–53)
HDLC SERPL-MCNC: 68 MG/DL
HGB BLD-MCNC: 15.4 G/DL (ref 13.3–17.7)
IMM GRANULOCYTES # BLD: 0 10E9/L (ref 0–0.4)
IMM GRANULOCYTES NFR BLD: 0.6 %
LDLC SERPL CALC-MCNC: 130 MG/DL
LYMPHOCYTES # BLD AUTO: 1.4 10E9/L (ref 0.8–5.3)
LYMPHOCYTES NFR BLD AUTO: 26.7 %
MCH RBC QN AUTO: 32.9 PG (ref 26.5–33)
MCHC RBC AUTO-ENTMCNC: 33.9 G/DL (ref 31.5–36.5)
MCV RBC AUTO: 97 FL (ref 78–100)
MONOCYTES # BLD AUTO: 0.6 10E9/L (ref 0–1.3)
MONOCYTES NFR BLD AUTO: 11 %
NEUTROPHILS # BLD AUTO: 3.1 10E9/L (ref 1.6–8.3)
NEUTROPHILS NFR BLD AUTO: 60.1 %
NONHDLC SERPL-MCNC: 142 MG/DL
NRBC # BLD AUTO: 0 10*3/UL
NRBC BLD AUTO-RTO: 0 /100
PLATELET # BLD AUTO: 214 10E9/L (ref 150–450)
POTASSIUM SERPL-SCNC: 4.3 MMOL/L (ref 3.4–5.3)
PROT SERPL-MCNC: 7.9 G/DL (ref 6.8–8.8)
PSA SERPL-MCNC: 2.04 UG/L (ref 0–4)
RBC # BLD AUTO: 4.68 10E12/L (ref 4.4–5.9)
SODIUM SERPL-SCNC: 137 MMOL/L (ref 133–144)
TRIGL SERPL-MCNC: 62 MG/DL
WBC # BLD AUTO: 5.2 10E9/L (ref 4–11)

## 2020-02-07 PROCEDURE — 80053 COMPREHEN METABOLIC PANEL: CPT | Performed by: FAMILY MEDICINE

## 2020-02-07 PROCEDURE — 36415 COLL VENOUS BLD VENIPUNCTURE: CPT | Performed by: FAMILY MEDICINE

## 2020-02-07 PROCEDURE — 80061 LIPID PANEL: CPT | Performed by: FAMILY MEDICINE

## 2020-02-07 PROCEDURE — 84153 ASSAY OF PSA TOTAL: CPT | Performed by: FAMILY MEDICINE

## 2020-02-07 PROCEDURE — 85025 COMPLETE CBC W/AUTO DIFF WBC: CPT | Performed by: FAMILY MEDICINE

## 2020-02-10 ENCOUNTER — OFFICE VISIT (OUTPATIENT)
Dept: FAMILY MEDICINE | Facility: OTHER | Age: 65
End: 2020-02-10
Attending: FAMILY MEDICINE
Payer: COMMERCIAL

## 2020-02-10 VITALS
OXYGEN SATURATION: 98 % | SYSTOLIC BLOOD PRESSURE: 146 MMHG | WEIGHT: 190 LBS | HEIGHT: 70 IN | HEART RATE: 90 BPM | DIASTOLIC BLOOD PRESSURE: 86 MMHG | TEMPERATURE: 97.6 F | BODY MASS INDEX: 27.2 KG/M2

## 2020-02-10 DIAGNOSIS — B35.3 TINEA PEDIS OF BOTH FEET: ICD-10-CM

## 2020-02-10 DIAGNOSIS — N52.01 ERECTILE DYSFUNCTION DUE TO ARTERIAL INSUFFICIENCY: ICD-10-CM

## 2020-02-10 DIAGNOSIS — L30.0 NUMMULAR ECZEMATOUS DERMATITIS: ICD-10-CM

## 2020-02-10 DIAGNOSIS — R35.1 NOCTURIA: ICD-10-CM

## 2020-02-10 DIAGNOSIS — Z23 NEED FOR VACCINATION: ICD-10-CM

## 2020-02-10 DIAGNOSIS — R97.20 ELEVATED PROSTATE SPECIFIC ANTIGEN (PSA): ICD-10-CM

## 2020-02-10 DIAGNOSIS — F41.8 SITUATIONAL ANXIETY: ICD-10-CM

## 2020-02-10 DIAGNOSIS — R05.9 COUGH: ICD-10-CM

## 2020-02-10 DIAGNOSIS — R03.0 ELEVATED BLOOD PRESSURE READING WITHOUT DIAGNOSIS OF HYPERTENSION: ICD-10-CM

## 2020-02-10 DIAGNOSIS — Z00.00 ROUTINE GENERAL MEDICAL EXAMINATION AT A HEALTH CARE FACILITY: Primary | ICD-10-CM

## 2020-02-10 DIAGNOSIS — E78.00 PURE HYPERCHOLESTEROLEMIA: ICD-10-CM

## 2020-02-10 DIAGNOSIS — T63.441S BEE STING REACTION, ACCIDENTAL OR UNINTENTIONAL, SEQUELA: ICD-10-CM

## 2020-02-10 PROCEDURE — 90471 IMMUNIZATION ADMIN: CPT | Performed by: FAMILY MEDICINE

## 2020-02-10 PROCEDURE — 99215 OFFICE O/P EST HI 40 MIN: CPT | Mod: 25 | Performed by: FAMILY MEDICINE

## 2020-02-10 PROCEDURE — 90750 HZV VACC RECOMBINANT IM: CPT | Performed by: FAMILY MEDICINE

## 2020-02-10 RX ORDER — PRENATAL VIT 91/IRON/FOLIC/DHA 28-975-200
COMBINATION PACKAGE (EA) ORAL 2 TIMES DAILY
Qty: 30 G | Refills: 1 | Status: SHIPPED | OUTPATIENT
Start: 2020-02-10 | End: 2022-02-09

## 2020-02-10 RX ORDER — CODEINE PHOSPHATE AND GUAIFENESIN 10; 100 MG/5ML; MG/5ML
1-2 SOLUTION ORAL EVERY 4 HOURS PRN
Qty: 180 ML | Refills: 0 | Status: SHIPPED | OUTPATIENT
Start: 2020-02-10 | End: 2023-02-10

## 2020-02-10 RX ORDER — TRIAMCINOLONE ACETONIDE 1 MG/G
CREAM TOPICAL
Qty: 30 G | Refills: 3 | Status: SHIPPED | OUTPATIENT
Start: 2020-02-10 | End: 2021-02-08

## 2020-02-10 RX ORDER — EPINEPHRINE 0.3 MG/.3ML
0.3 INJECTION SUBCUTANEOUS PRN
Qty: 2 EACH | Refills: 1 | Status: SHIPPED | OUTPATIENT
Start: 2020-02-10

## 2020-02-10 RX ORDER — SILDENAFIL 100 MG/1
100 TABLET, FILM COATED ORAL DAILY PRN
Qty: 6 TABLET | Refills: 11 | Status: SHIPPED | OUTPATIENT
Start: 2020-02-10 | End: 2021-02-08

## 2020-02-10 RX ORDER — LORAZEPAM 1 MG/1
1 TABLET ORAL EVERY 8 HOURS PRN
Qty: 20 TABLET | Refills: 0 | Status: SHIPPED | OUTPATIENT
Start: 2020-02-10 | End: 2021-02-08

## 2020-02-10 ASSESSMENT — PATIENT HEALTH QUESTIONNAIRE - PHQ9: SUM OF ALL RESPONSES TO PHQ QUESTIONS 1-9: 0

## 2020-02-10 ASSESSMENT — MIFFLIN-ST. JEOR: SCORE: 1658.08

## 2020-02-10 ASSESSMENT — PAIN SCALES - GENERAL: PAINLEVEL: NO PAIN (0)

## 2020-02-10 NOTE — PATIENT INSTRUCTIONS
Ref Range & Units 3d ago 1yr ago   PSA 0 - 4 ug/L 2.04  2.88 CM   Comment: Assay Method:  Chemiluminescence using Siemens Vista analyzer   Resulting Agency  [facility]         Specimen Collected: 02/07/20 11:05 AM Last Resulted: 02/07/20  1:01 PM         Lab Flowsheet       Order Details       View Encounter       Lab and Collection Details       Routing       Result History         CM=Additional comments           Comprehensive metabolic panel   Order: [id number]   Status:  Final result   Visible to patient:  No (Inaccessible in MyChart) Dx:  Routine general medical examination a...    Ref Range & Units 3d ago 1yr ago   Sodium 133 - 144 mmol/L 137  140    Potassium 3.4 - 5.3 mmol/L 4.3  4.0    Chloride 94 - 109 mmol/L 105  107    Carbon Dioxide 20 - 32 mmol/L 25  22    Anion Gap 3 - 14 mmol/L 7  11    Glucose 70 - 99 mg/dL 94  84    Urea Nitrogen 7 - 30 mg/dL 22  15    Creatinine 0.66 - 1.25 mg/dL 0.94  0.85    GFR Estimate >60 mL/min/ 85  >90 CM   Comment: Non  GFR Calc   Starting 12/18/2018, serum creatinine based estimated GFR (eGFR) will be   calculated using the Chronic Kidney Disease Epidemiology Collaboration   (CKD-EPI) equation.    GFR Estimate If Black >60 mL/min/ >90  >90 CM   Comment:  GFR Calc   Starting 12/18/2018, serum creatinine based estimated GFR (eGFR) will be   calculated using the Chronic Kidney Disease Epidemiology Collaboration   (CKD-EPI) equation.    Calcium 8.5 - 10.1 mg/dL 9.2  8.9    Bilirubin Total 0.2 - 1.3 mg/dL 1.0  0.8    Albumin 3.4 - 5.0 g/dL 4.4  4.1    Protein Total 6.8 - 8.8 g/dL 7.9  7.8    Alkaline Phosphatase 40 - 150 U/L 66  60    ALT 0 - 70 U/L 27  34    AST 0 - 45 U/L 14  21    Resulting Agency  [facility]         Specimen Collected: 02/07/20 11:05 AM Last Resulted: 02/07/20 12:59 PM          Lab Flowsheet       Order Details       View Encounter       Lab and Collection Details       Routing       Result History         CM=Additional comments           Contains abnormal data Lipid Profile   Order: [id number]   Status:  Final result   Visible to patient:  No (Inaccessible in MyChart) Dx:  Routine general medical examination a...    Ref Range & Units 3d ago 1yr ago   Cholesterol <200 mg/dL 210High   151    Comment: Desirable:       <200 mg/dl   Triglycerides <150 mg/dL 62  67    HDL Cholesterol >39 mg/dL 68  41    LDL Cholesterol Calculated <100 mg/dL 130High   97 CM   Comment: Above desirable:  100-129 mg/dl   Borderline High:  130-159 mg/dL   High:             160-189 mg/dL   Very high:       >189 mg/dl    Non HDL Cholesterol <130 mg/dL 142High   110    Comment: Above Desirable:  130-159 mg/dl   Borderline high:  160-189 mg/dl   High:             190-219 mg/dl   Very high:       >219 mg/dl    Resulting Agency  [facility]         Specimen Collected: 02/07/20 11:05 AM Last Resulted: 02/07/20 12:58 PM         Lab Flowsheet       Order Details       View Encounter       Lab and Collection Details       Routing       Result History         CM=Additional comments           CBC with platelets and differential   Order: [id number]   Status:  Final result   Visible to patient:  No (Inaccessible in [email or web address]) Dx:  Routine general medical examination a...    Ref Range & Units 3d ago 1yr ago   WBC 4.0 - 11.0 10e9/L 5.2  3.8Low     RBC Count 4.4 - 5.9 10e12/L 4.68  4.51    Hemoglobin 13.3 - 17.7 g/dL 15.4  14.7    Hematocrit 40.0 - 53.0 % 45.4  43.9    MCV 78 - 100 fl 97  97    MCH 26.5 - 33.0 pg 32.9  32.6    MCHC 31.5 - 36.5 g/dL 33.9  33.5    RDW 10.0 - 15.0 % 12.8  12.7    Platelet Count 150 - 450 10e9/L 214  214    Diff Method  Automated Method  Automated Method    % Neutrophils % 60.1  55.0    % Lymphocytes % 26.7  31.9    % Monocytes %  11.0  11.3    % Eosinophils % 0.6  0.5    % Basophils % 1.0  1.0    % Immature Granulocytes % 0.6  0.3    Nucleated RBCs 0 /100 0  0    Absolute Neutrophil 1.6 - 8.3 10e9/L 3.1  2.1    Absolute Lymphocytes 0.8 - 5.3 10e9/L 1.4  1.2    Absolute Monocytes 0.0 - 1.3 10e9/L 0.6  0.4    Absolute Eosinophils 0.0 - 0.7 10e9/L 0.0  0.0    Absolute Basophils 0.0 - 0.2 10e9/L 0.1  0.0    Abs Immature Granulocytes 0 - 0.4 10e9/L 0.0  0.0    Absolute Nucleated RBC  0.0  0.0    Resulting Agency  [facility]         Specimen Collected: 02/07/20 11:05 AM Last Resulted: 02/07/20 12:41 PM         Lab Flowsheet       Order Details       View Encounter       Lab and Collection Details       Routing       Result History

## 2021-02-02 NOTE — PROGRESS NOTES
"SUBJECTIVE:   Zeus Ford is a 65 year old male who presents for Preventive Visit.      Patient has been advised of split billing requirements and indicates understanding: No  Are you in the first 12 months of your Medicare Part B coverage?  Yes, pt has eye glasses and had his eyes examined in .    Physical Health:    In general, how would you rate your overall physical health? excellent    Outside of work, how many days during the week do you exercise? 6-7 days/week    Outside of work, approximately how many minutes a day do you exercise?30-45 minutes    If you drink alcohol do you typically have >3 drinks per day or >7 drinks per week? No    Do you usually eat at least 4 servings of fruit and vegetables a day, include whole grains & fiber and avoid regularly eating high fat or \"junk\" foods? Yes    Do you have any problems taking medications regularly?  No    Do you have any side effects from medications? none    Needs assistance for the following daily activities: no assistance needed    Which of the following safety concerns are present in your home?  none identified     Hearing impairment: No    In the past 6 months, have you been bothered by leaking of urine? no    Mental Health:    In general, how would you rate your overall mental or emotional health? excellent  PHQ-2 Score:      Do you feel safe in your environment? Yes    Have you ever done Advance Care Planning? (For example, a Health Directive, POLST, or a discussion with a medical provider or your loved ones about your wishes): Yes, advance care planning is on file.    Additional concerns to address?  No    Fall risk:  Fallen 2 or more times in the past year?: No  Any fall with injury in the past year?: No    Cognitive Screenin) Repeat 3 items (Leader, Season, Table)    2) Clock draw: NORMAL  3) 3 item recall: Recalls 3 objects  Results: 3 items recalled: COGNITIVE IMPAIRMENT LESS LIKELY    Mini-CogTM Copyright SHIRA Leal. Licensed by the " author for use in [facility] [employer]; reprinted with permission ([email or web address]). All rights reserved.      Do you have sleep apnea, excessive snoring or daytime drowsiness?: no        Hypertension Follow-up      Do you check your blood pressure regularly outside of the clinic? Yes     Are you following a low salt diet? Yes    Are your blood pressures ever more than 140 on the top number (systolic) OR more   than 90 on the bottom number (diastolic), for example 140/90? No     BP AT HOME ALWAYS TO GOAL - GETS NERVOUS COMING HERE       Reviewed and updated as needed this visit by clinical staff                 Reviewed and updated as needed this visit by Provider                Social History     Tobacco Use     Smoking status: Never Smoker     Smokeless tobacco: Never Used   Substance Use Topics     Alcohol use: Yes     Comment: rarely                            Current providers sharing in care for this patient include:   Patient Care Team:  Cirilo Landeros MD as PCP - General  Cirilo Landeros MD as Assigned PCP    The following health maintenance items are reviewed in Epic and correct as of today:  Health Maintenance   Topic Date Due     HIV SCREENING  06/06/1970     HEPATITIS C SCREENING  06/06/1973     MEDICARE ANNUAL WELLNESS VISIT  06/06/2020     Pneumococcal Vaccine: 65+ Years (1 of 1 - PPSV23) 06/06/2020     AORTIC ANEURYSM SCREENING (SYSTEM ASSIGNED)  06/06/2020     PHQ-2  01/01/2021     FALL RISK ASSESSMENT  02/10/2021     ADVANCE CARE PLANNING  02/09/2022     LIPID  02/07/2025     DTAP/TDAP/TD IMMUNIZATION (3 - Td) 02/11/2025     COLORECTAL CANCER SCREENING  04/10/2025     INFLUENZA VACCINE  Completed     ZOSTER IMMUNIZATION  Completed     Pneumococcal Vaccine: Pediatrics (0 to 5 Years) and At-Risk Patients (6 to 64 Years)  Aged Out     IPV IMMUNIZATION  Aged Out     MENINGITIS IMMUNIZATION  Aged Out     HEPATITIS B IMMUNIZATION  Aged Out     Labs reviewed in EPIC      ROS:  Constitutional, HEENT,  "cardiovascular, pulmonary, GI, , musculoskeletal, neuro, skin, endocrine and psych systems are negative, except as otherwise noted.    OBJECTIVE:   BP (!) 160/82 (Patient Position: Sitting)   Pulse 101   Ht 1.778 m (5' 10\")   Wt 85.7 kg (189 lb)   SpO2 97%   BMI 27.12 kg/m   Estimated body mass index is 27.26 kg/m  as calculated from the following:    Height as of 2/10/20: 1.778 m (5' 10\").    Weight as of 2/10/20: 86.2 kg (190 lb).  EXAM:   GENERAL: healthy, alert and no distress  EYES: Eyes grossly normal to inspection, PERRL and conjunctivae and sclerae normal  HENT: ear canals and TM's normal, nose and mouth without ulcers or lesions  NECK: no adenopathy, no asymmetry, masses, or scars and thyroid normal to palpation  RESP: lungs clear to auscultation - no rales, rhonchi or wheezes  CV: regular rate and rhythm, normal S1 S2, no S3 or S4, no murmur, click or rub, no peripheral edema and peripheral pulses strong  ABDOMEN: soft, nontender, no hepatosplenomegaly, no masses and bowel sounds normal   (male): normal male genitalia without lesions or urethral discharge, no hernia  MS: no gross musculoskeletal defects noted, no edema  SKIN: no suspicious lesions or rashes  NEURO: Normal strength and tone, mentation intact and speech normal  PSYCH: mentation appears normal, affect normal/bright  LYMPH: no cervical, supraclavicular, axillary, or inguinal adenopathy    Diagnostic Test Results:  Labs reviewed in Epic  No results found for this or any previous visit (from the past 48 hour(s)).    Orders Only on 02/07/2020   Component Date Value Ref Range Status     WBC 02/07/2020 5.2  4.0 - 11.0 10e9/L Final     RBC Count 02/07/2020 4.68  4.4 - 5.9 10e12/L Final     Hemoglobin 02/07/2020 15.4  13.3 - 17.7 g/dL Final     Hematocrit 02/07/2020 45.4  40.0 - 53.0 % Final     MCV 02/07/2020 97  78 - 100 fl Final     MCH 02/07/2020 32.9  26.5 - 33.0 pg Final     MCHC 02/07/2020 33.9  31.5 - 36.5 g/dL Final     RDW " 02/07/2020 12.8  10.0 - 15.0 % Final     Platelet Count 02/07/2020 214  150 - 450 10e9/L Final     Diff Method 02/07/2020 Automated Method   Final     % Neutrophils 02/07/2020 60.1  % Final     % Lymphocytes 02/07/2020 26.7  % Final     % Monocytes 02/07/2020 11.0  % Final     % Eosinophils 02/07/2020 0.6  % Final     % Basophils 02/07/2020 1.0  % Final     % Immature Granulocytes 02/07/2020 0.6  % Final     Nucleated RBCs 02/07/2020 0  0 /100 Final     Absolute Neutrophil 02/07/2020 3.1  1.6 - 8.3 10e9/L Final     Absolute Lymphocytes 02/07/2020 1.4  0.8 - 5.3 10e9/L Final     Absolute Monocytes 02/07/2020 0.6  0.0 - 1.3 10e9/L Final     Absolute Eosinophils 02/07/2020 0.0  0.0 - 0.7 10e9/L Final     Absolute Basophils 02/07/2020 0.1  0.0 - 0.2 10e9/L Final     Abs Immature Granulocytes 02/07/2020 0.0  0 - 0.4 10e9/L Final     Absolute Nucleated RBC 02/07/2020 0.0   Final     Sodium 02/07/2020 137  133 - 144 mmol/L Final     Potassium 02/07/2020 4.3  3.4 - 5.3 mmol/L Final     Chloride 02/07/2020 105  94 - 109 mmol/L Final     Carbon Dioxide 02/07/2020 25  20 - 32 mmol/L Final     Anion Gap 02/07/2020 7  3 - 14 mmol/L Final     Glucose 02/07/2020 94  70 - 99 mg/dL Final     Urea Nitrogen 02/07/2020 22  7 - 30 mg/dL Final     Creatinine 02/07/2020 0.94  0.66 - 1.25 mg/dL Final     GFR Estimate 02/07/2020 85  >60 mL/min/[1.73_m2] Final    Comment: Non  GFR Calc  Starting 12/18/2018, serum creatinine based estimated GFR (eGFR) will be   calculated using the Chronic Kidney Disease Epidemiology Collaboration   (CKD-EPI) equation.       GFR Estimate If Black 02/07/2020 >90  >60 mL/min/[1.73_m2] Final    Comment:  GFR Calc  Starting 12/18/2018, serum creatinine based estimated GFR (eGFR) will be   calculated using the Chronic Kidney Disease Epidemiology Collaboration   (CKD-EPI) equation.       Calcium 02/07/2020 9.2  8.5 - 10.1 mg/dL Final     Bilirubin Total 02/07/2020 1.0  0.2 - 1.3  mg/dL Final     Albumin 02/07/2020 4.4  3.4 - 5.0 g/dL Final     Protein Total 02/07/2020 7.9  6.8 - 8.8 g/dL Final     Alkaline Phosphatase 02/07/2020 66  40 - 150 U/L Final     ALT 02/07/2020 27  0 - 70 U/L Final     AST 02/07/2020 14  0 - 45 U/L Final     Cholesterol 02/07/2020 210* <200 mg/dL Final    Desirable:       <200 mg/dl     Triglycerides 02/07/2020 62  <150 mg/dL Final     HDL Cholesterol 02/07/2020 68  >39 mg/dL Final     LDL Cholesterol Calculated 02/07/2020 130* <100 mg/dL Final    Comment: Above desirable:  100-129 mg/dl  Borderline High:  130-159 mg/dL  High:             160-189 mg/dL  Very high:       >189 mg/dl       Non HDL Cholesterol 02/07/2020 142* <130 mg/dL Final    Comment: Above Desirable:  130-159 mg/dl  Borderline high:  160-189 mg/dl  High:             190-219 mg/dl  Very high:       >219 mg/dl       PSA 02/07/2020 2.04  0 - 4 ug/L Final    Assay Method:  Chemiluminescence using Siemens Vista analyzer         ASSESSMENT / PLAN:   1. Routine general medical examination at a health care facility  Pt is here for comprehensive follow up on below.   Doing well. No issues.  Discussed AAA screen- he deferred.   - CBC with platelets differential  - Comprehensive metabolic panel  - Lipid Profile  - PSA tumor marker    2. Elevated prostate specific antigen (PSA)  In past. Resolved.   - PSA tumor marker    3. hypercholesterolemia  In past. Good control by diet   - Comprehensive metabolic panel  - Lipid Profile    4. Elevated BP/Pulse -white coat syndrome.  Checks BP a lot at home and always to goal.    5. Nocturia  Stable     6. Family history of malignant neoplasm of prostate  PSA stable     7. Encounter for immunization  Shot given.   - PPSV23, IM/SUBQ (2+ YRS) - [id number]    ED- RF done- Viagra works well    Anxiety - uses about 20 Ativan a year. RF done    Eczema- RF done.       Patient has been advised of split billing requirements and indicates understanding:     COUNSELING:  Reviewed  "preventive health counseling, as reflected in patient instructions       Regular exercise       Healthy diet/nutrition       Colon cancer screening       Prostate cancer screening    Estimated body mass index is 27.26 kg/m  as calculated from the following:    Height as of 2/10/20: 1.778 m (5' 10\").    Weight as of 2/10/20: 86.2 kg (190 lb).        He reports that he has never smoked. He has never used smokeless tobacco.    Appropriate preventive services were discussed with this patient, including applicable screening as appropriate for cardiovascular disease, diabetes, osteopenia/osteoporosis, and glaucoma.  As appropriate for age/gender, discussed screening for colorectal cancer, prostate cancer, breast cancer, and cervical cancer. Checklist reviewing preventive services available has been given to the patient.    Reviewed patients plan of care and provided an AVS. The Basic Care Plan (routine screening as documented in Health Maintenance) for Zeus meets the Care Plan requirement. This Care Plan has been established and reviewed with the Patient.    Counseling Resources:  ATP IV Guidelines  Pooled Cohorts Equation Calculator  Breast Cancer Risk Calculator  BRCA-Related Cancer Risk Assessment: FHS-7 Tool  FRAX Risk Assessment  ICSI Preventive Guidelines  Dietary Guidelines for Americans, 2010  USDA's MyPlate  ASA Prophylaxis  Lung CA Screening    Cirilo Landeros MD  [facility] - [place]  "

## 2021-02-02 NOTE — PATIENT INSTRUCTIONS
Preventive Health Recommendations:     See your health care provider every year to    Review health changes.     Discuss preventive care.      Review your medicines if your doctor has prescribed any.      Talk with your health care provider about whether you should have a test to screen for prostate cancer (PSA).    Every 3 years, have a diabetes test (fasting glucose). If you are at risk for diabetes, you should have this test more often.    Every 5 years, have a cholesterol test. Have this test more often if you are at risk for high cholesterol or heart disease.     Every 10 years, have a colonoscopy. Or, have a yearly FIT test (stool test). These exams will check for colon cancer.    Talk to with your health care provider about screening for Abdominal Aortic Aneurysm if you have a family history of AAA or have a history of smoking.    Shots:     Get a flu shot each year.     Get a tetanus shot every 10 years.     Talk to your doctor about your pneumonia vaccines. There are now two you should receive - Pneumovax (PPSV 23) and Prevnar (PCV 13).     Talk to your pharmacist about a shingles vaccine.     Talk to your doctor about the hepatitis B vaccine.  Nutrition:     Eat at least 5 servings of fruits and vegetables each day.     Eat whole-grain bread, whole-wheat pasta and brown rice instead of white grains and rice.     Get adequate Calcium and Vitamin D.   Lifestyle    Exercise for at least 150 minutes a week (30 minutes a day, 5 days a week). This will help you control your weight and prevent disease.     Limit alcohol to one drink per day.     No smoking.     Wear sunscreen to prevent skin cancer.    See your dentist every six months for an exam and cleaning.    See your eye doctor every 1 to 2 years to screen for conditions such as glaucoma, macular degeneration, cataracts, etc.    Personalized Prevention Plan  You are due for the preventive services outlined below.  Your care team is available to assist you  in scheduling these services.  If you have already completed any of these items, please share that information with your care team to update in your medical record.  Health Maintenance Due   Topic Date Due     HIV Screening  06/06/1970     Hepatitis C Screening  06/06/1973     Pneumococcal Vaccine (1 of 1 - PPSV23) 06/06/2020     AORTIC ANEURYSM SCREENING (SYSTEM ASSIGNED)  06/06/2020     PHQ-2  01/01/2021     FALL RISK ASSESSMENT  02/10/2021

## 2021-02-05 ENCOUNTER — APPOINTMENT (OUTPATIENT)
Dept: LAB | Facility: OTHER | Age: 66
End: 2021-02-05
Attending: FAMILY MEDICINE
Payer: MEDICARE

## 2021-02-05 LAB
ALBUMIN SERPL-MCNC: 4.2 G/DL (ref 3.4–5)
ALP SERPL-CCNC: 67 U/L (ref 40–150)
ALT SERPL W P-5'-P-CCNC: 23 U/L (ref 0–70)
ANION GAP SERPL CALCULATED.3IONS-SCNC: 7 MMOL/L (ref 3–14)
AST SERPL W P-5'-P-CCNC: 18 U/L (ref 0–45)
BASOPHILS # BLD AUTO: 0 10E9/L (ref 0–0.2)
BASOPHILS NFR BLD AUTO: 0.5 %
BILIRUB SERPL-MCNC: 1.1 MG/DL (ref 0.2–1.3)
BUN SERPL-MCNC: 19 MG/DL (ref 7–30)
CALCIUM SERPL-MCNC: 9.3 MG/DL (ref 8.5–10.1)
CHLORIDE SERPL-SCNC: 105 MMOL/L (ref 94–109)
CHOLEST SERPL-MCNC: 179 MG/DL
CO2 SERPL-SCNC: 25 MMOL/L (ref 20–32)
CREAT SERPL-MCNC: 0.88 MG/DL (ref 0.66–1.25)
DIFFERENTIAL METHOD BLD: NORMAL
EOSINOPHIL # BLD AUTO: 0 10E9/L (ref 0–0.7)
EOSINOPHIL NFR BLD AUTO: 0.5 %
ERYTHROCYTE [DISTWIDTH] IN BLOOD BY AUTOMATED COUNT: 13 % (ref 10–15)
GFR SERPL CREATININE-BSD FRML MDRD: 90 ML/MIN/{1.73_M2}
GLUCOSE SERPL-MCNC: 86 MG/DL (ref 70–99)
HCT VFR BLD AUTO: 45 % (ref 40–53)
HDLC SERPL-MCNC: 61 MG/DL
HGB BLD-MCNC: 15.1 G/DL (ref 13.3–17.7)
IMM GRANULOCYTES # BLD: 0 10E9/L (ref 0–0.4)
IMM GRANULOCYTES NFR BLD: 0.3 %
LDLC SERPL CALC-MCNC: 106 MG/DL
LYMPHOCYTES # BLD AUTO: 1.3 10E9/L (ref 0.8–5.3)
LYMPHOCYTES NFR BLD AUTO: 19.6 %
MCH RBC QN AUTO: 32.8 PG (ref 26.5–33)
MCHC RBC AUTO-ENTMCNC: 33.6 G/DL (ref 31.5–36.5)
MCV RBC AUTO: 98 FL (ref 78–100)
MONOCYTES # BLD AUTO: 0.6 10E9/L (ref 0–1.3)
MONOCYTES NFR BLD AUTO: 9.4 %
NEUTROPHILS # BLD AUTO: 4.5 10E9/L (ref 1.6–8.3)
NEUTROPHILS NFR BLD AUTO: 69.7 %
NONHDLC SERPL-MCNC: 118 MG/DL
NRBC # BLD AUTO: 0 10*3/UL
NRBC BLD AUTO-RTO: 0 /100
PLATELET # BLD AUTO: 208 10E9/L (ref 150–450)
POTASSIUM SERPL-SCNC: 4.5 MMOL/L (ref 3.4–5.3)
PROT SERPL-MCNC: 7.7 G/DL (ref 6.8–8.8)
PSA SERPL-MCNC: 1.64 UG/L (ref 0–4)
RBC # BLD AUTO: 4.6 10E12/L (ref 4.4–5.9)
SODIUM SERPL-SCNC: 137 MMOL/L (ref 133–144)
TRIGL SERPL-MCNC: 58 MG/DL
WBC # BLD AUTO: 6.4 10E9/L (ref 4–11)

## 2021-02-05 PROCEDURE — 84153 ASSAY OF PSA TOTAL: CPT | Mod: ZL | Performed by: FAMILY MEDICINE

## 2021-02-05 PROCEDURE — 36415 COLL VENOUS BLD VENIPUNCTURE: CPT | Mod: ZL | Performed by: FAMILY MEDICINE

## 2021-02-05 PROCEDURE — 85025 COMPLETE CBC W/AUTO DIFF WBC: CPT | Mod: ZL | Performed by: FAMILY MEDICINE

## 2021-02-05 PROCEDURE — 80053 COMPREHEN METABOLIC PANEL: CPT | Mod: ZL | Performed by: FAMILY MEDICINE

## 2021-02-05 PROCEDURE — 80061 LIPID PANEL: CPT | Mod: ZL | Performed by: FAMILY MEDICINE

## 2021-02-08 ENCOUNTER — OFFICE VISIT (OUTPATIENT)
Dept: FAMILY MEDICINE | Facility: OTHER | Age: 66
End: 2021-02-08
Attending: FAMILY MEDICINE
Payer: COMMERCIAL

## 2021-02-08 VITALS
HEIGHT: 70 IN | HEART RATE: 101 BPM | SYSTOLIC BLOOD PRESSURE: 160 MMHG | DIASTOLIC BLOOD PRESSURE: 82 MMHG | OXYGEN SATURATION: 97 % | WEIGHT: 189 LBS | BODY MASS INDEX: 27.06 KG/M2

## 2021-02-08 DIAGNOSIS — R03.0 ELEVATED BLOOD PRESSURE READING WITHOUT DIAGNOSIS OF HYPERTENSION: ICD-10-CM

## 2021-02-08 DIAGNOSIS — Z23 ENCOUNTER FOR IMMUNIZATION: ICD-10-CM

## 2021-02-08 DIAGNOSIS — Z80.42 FAMILY HISTORY OF MALIGNANT NEOPLASM OF PROSTATE: ICD-10-CM

## 2021-02-08 DIAGNOSIS — R35.1 NOCTURIA: ICD-10-CM

## 2021-02-08 DIAGNOSIS — L30.0 NUMMULAR ECZEMATOUS DERMATITIS: ICD-10-CM

## 2021-02-08 DIAGNOSIS — N52.01 ERECTILE DYSFUNCTION DUE TO ARTERIAL INSUFFICIENCY: ICD-10-CM

## 2021-02-08 DIAGNOSIS — F41.8 SITUATIONAL ANXIETY: ICD-10-CM

## 2021-02-08 DIAGNOSIS — Z00.00 ROUTINE GENERAL MEDICAL EXAMINATION AT A HEALTH CARE FACILITY: Primary | ICD-10-CM

## 2021-02-08 DIAGNOSIS — E78.00 PURE HYPERCHOLESTEROLEMIA: ICD-10-CM

## 2021-02-08 DIAGNOSIS — R97.20 ELEVATED PROSTATE SPECIFIC ANTIGEN (PSA): ICD-10-CM

## 2021-02-08 PROCEDURE — 99397 PER PM REEVAL EST PAT 65+ YR: CPT | Performed by: FAMILY MEDICINE

## 2021-02-08 PROCEDURE — G0009 ADMIN PNEUMOCOCCAL VACCINE: HCPCS

## 2021-02-08 RX ORDER — MULTIVIT WITH MINERALS/LUTEIN
1 TABLET ORAL DAILY
COMMUNITY

## 2021-02-08 RX ORDER — TRIAMCINOLONE ACETONIDE 1 MG/G
CREAM TOPICAL
Qty: 30 G | Refills: 3 | Status: SHIPPED | OUTPATIENT
Start: 2021-02-08 | End: 2023-02-10

## 2021-02-08 RX ORDER — SILDENAFIL 100 MG/1
100 TABLET, FILM COATED ORAL DAILY PRN
Qty: 6 TABLET | Refills: 11 | Status: SHIPPED | OUTPATIENT
Start: 2021-02-08 | End: 2022-02-09

## 2021-02-08 RX ORDER — LORAZEPAM 1 MG/1
1 TABLET ORAL EVERY 8 HOURS PRN
Qty: 20 TABLET | Refills: 0 | Status: SHIPPED | OUTPATIENT
Start: 2021-02-08 | End: 2022-02-09

## 2021-02-08 ASSESSMENT — ANXIETY QUESTIONNAIRES
GAD7 TOTAL SCORE: 2
6. BECOMING EASILY ANNOYED OR IRRITABLE: NOT AT ALL
1. FEELING NERVOUS, ANXIOUS, OR ON EDGE: SEVERAL DAYS
5. BEING SO RESTLESS THAT IT IS HARD TO SIT STILL: NOT AT ALL
7. FEELING AFRAID AS IF SOMETHING AWFUL MIGHT HAPPEN: NOT AT ALL
2. NOT BEING ABLE TO STOP OR CONTROL WORRYING: NOT AT ALL
4. TROUBLE RELAXING: NOT AT ALL
3. WORRYING TOO MUCH ABOUT DIFFERENT THINGS: SEVERAL DAYS

## 2021-02-08 ASSESSMENT — PAIN SCALES - GENERAL: PAINLEVEL: NO PAIN (0)

## 2021-02-08 ASSESSMENT — PATIENT HEALTH QUESTIONNAIRE - PHQ9: SUM OF ALL RESPONSES TO PHQ QUESTIONS 1-9: 0

## 2021-02-08 ASSESSMENT — MIFFLIN-ST. JEOR: SCORE: 1648.55

## 2021-02-08 NOTE — NURSING NOTE
"Chief Complaint   Patient presents with     Physical       Initial BP (!) 160/82 (Patient Position: Sitting)   Pulse 101   Ht 1.778 m (5' 10\")   Wt 85.7 kg (189 lb)   SpO2 97%   BMI 27.12 kg/m   Estimated body mass index is 27.12 kg/m  as calculated from the following:    Height as of this encounter: 1.778 m (5' 10\").    Weight as of this encounter: 85.7 kg (189 lb).  Medication Reconciliation: complete  Maria Elena Barron LPN  "

## 2021-02-09 ASSESSMENT — ANXIETY QUESTIONNAIRES: GAD7 TOTAL SCORE: 2

## 2021-10-03 ENCOUNTER — HEALTH MAINTENANCE LETTER (OUTPATIENT)
Age: 66
End: 2021-10-03

## 2021-11-16 ENCOUNTER — TELEPHONE (OUTPATIENT)
Dept: FAMILY MEDICINE | Facility: OTHER | Age: 66
End: 2021-11-16
Payer: COMMERCIAL

## 2021-11-16 DIAGNOSIS — Z80.42 FAMILY HISTORY OF MALIGNANT NEOPLASM OF PROSTATE: ICD-10-CM

## 2021-11-16 DIAGNOSIS — E78.00 PURE HYPERCHOLESTEROLEMIA: ICD-10-CM

## 2021-11-16 DIAGNOSIS — R97.20 ELEVATED PROSTATE SPECIFIC ANTIGEN (PSA): ICD-10-CM

## 2021-11-16 DIAGNOSIS — Z00.00 ROUTINE GENERAL MEDICAL EXAMINATION AT A HEALTH CARE FACILITY: Primary | ICD-10-CM

## 2021-11-16 NOTE — TELEPHONE ENCOUNTER
10:04 AM    Reason for Call: Phone Call    Description: Zeus is coming in for his physical on February 9, 2022 he would like to come in on Monday February 7, 2022 for his fasting lab if Dr Landeros will put in the orders and patient would like to be called to set up this appointment.     Was an appointment offered for this call? No  If yes : Appointment type              Date    Preferred method for responding to this message: Telephone Call  What is your phone number ? [phone]    If we cannot reach you directly, may we leave a detailed response at the number you provided? Yes    Can this message wait until your PCP/provider returns, if available today? YES, No    Desiree Ruiz

## 2022-01-03 ENCOUNTER — NURSE TRIAGE (OUTPATIENT)
Dept: FAMILY MEDICINE | Facility: OTHER | Age: 67
End: 2022-01-03
Payer: COMMERCIAL

## 2022-01-03 ENCOUNTER — OFFICE VISIT (OUTPATIENT)
Dept: FAMILY MEDICINE | Facility: OTHER | Age: 67
End: 2022-01-03
Attending: NURSE PRACTITIONER
Payer: COMMERCIAL

## 2022-01-03 VITALS
OXYGEN SATURATION: 98 % | WEIGHT: 188 LBS | BODY MASS INDEX: 26.92 KG/M2 | HEART RATE: 92 BPM | SYSTOLIC BLOOD PRESSURE: 162 MMHG | DIASTOLIC BLOOD PRESSURE: 78 MMHG | HEIGHT: 70 IN | TEMPERATURE: 97.7 F | RESPIRATION RATE: 20 BRPM

## 2022-01-03 DIAGNOSIS — H00.015 HORDEOLUM EXTERNUM OF LEFT LOWER EYELID: Primary | ICD-10-CM

## 2022-01-03 PROCEDURE — G0463 HOSPITAL OUTPT CLINIC VISIT: HCPCS

## 2022-01-03 PROCEDURE — 99213 OFFICE O/P EST LOW 20 MIN: CPT | Performed by: NURSE PRACTITIONER

## 2022-01-03 RX ORDER — ERYTHROMYCIN 5 MG/G
0.5 OINTMENT OPHTHALMIC 4 TIMES DAILY
Qty: 14 G | Refills: 0 | Status: SHIPPED | OUTPATIENT
Start: 2022-01-03 | End: 2022-01-10

## 2022-01-03 ASSESSMENT — MIFFLIN-ST. JEOR: SCORE: 1639.01

## 2022-01-03 NOTE — PATIENT INSTRUCTIONS
Patient Education     Sty (or Stye)  A sty is when the oil gland of the eyelid becomes inflamed. It may develop into an infection with a small pocket of pus (an abscess). This can cause pain, redness, and swelling. In early stages, a sty is treated with antibiotic cream, eye drops, or a small towel soaked in warm water (a warm compress). More severe cases may need to be opened and drained by a healthcare provider.   Home care    Eye drops or ointment are often prescribed to treat the infection. Use these as directed.     Artificial tears may also be used to lubricate the eye and make it more comfortable. You can buy these over the counter without a prescription. Talk with your healthcare provider before using any over-the-counter treatment for a sty.    Apply a warm, damp towel to the affected area for at least 5 minutes, 3 to 4 times a day for a week. Warm compresses open the pores and speed the healing. Make sure the compresses are not too hot, as they may burn your eyelid.    Sometimes the sty will drain with this treatment alone. If this happens, keep using the antibiotic until all the redness and swelling are gone.    Wash your hands before and after touching the infected eyelid.    Don t squeeze or try to break open the sty.    Follow-up care  Follow up with your healthcare provider, or as advised.   When to seek medical advice  Call your healthcare provider or seek medical care right away if any of these occur:     Increase in swelling or redness around the eyelid after 48 to 72 hours    Increase in eye pain or the eyelid blisters    Increase in warmth--the eyelid feels hot    Drainage of blood or thick pus from the sty    Blister on the eyelid    Inability to open the eyelid due to swelling    Fever of 100.4 F (38 C) or above, or as directed by your provider    Vision changes    Headache or stiff neck    The sty comes back  Geeta last reviewed this educational content on 6/1/2020 2000-2021 [employer]  [employer]. All rights reserved. This information is not intended as a substitute for professional medical care. Always follow your healthcare professional's instructions.

## 2022-01-03 NOTE — PROGRESS NOTES
"  Assessment & Plan     Hordeolum externum of left lower eyelid  - erythromycin (ROMYCIN) 5 MG/GM ophthalmic ointment; Place 0.5 inches Into the left eye 4 times daily for 7 days    Prescription drug management     BMI:   No follow-ups on file.    Jennifer Medina, [facility] - [place]    Trina Schulz is a 66 year old who presents for the following health issues    HPI     Eye(s) Problem  Onset/Duration: 6 weeks  Description:   Location: YES- bottom of left eye  Pain: no  Redness: YES  Accompanying Signs & Symptoms:  Discharge/mattering: YES- was like a \"little white head\"  Swelling: YES- minimal   Visual changes: no  Fever: no  Nasal Congestion: no  Bothered by bright lights: no  History:  Trauma: no  Foreign body exposure: no  Wearing contacts: no  Precipitating or alleviating factors: None  Therapies tried and outcome: ellen Schulz reports that he exercises on his [place] without symptoms. He reports white coat syndrome.  He has 130-140's / 70-80's at home.         Review of Systems   Constitutional, HEENT, cardiovascular, pulmonary, gi and gu systems are negative, except as otherwise noted.      Objective    BP (!) 162/78 (BP Location: Left arm, Patient Position: Sitting, Cuff Size: Adult Large)   Pulse 92   Temp 97.7  F (36.5  C) (Tympanic)   Resp 20   Ht 1.778 m (5' 10\")   Wt 85.3 kg (188 lb)   SpO2 98%   BMI 26.98 kg/m    Body mass index is 26.98 kg/m .       Physical Exam   GENERAL: healthy, alert and no distress  EYES: Small white pustule along boarder of left lower eyelid with slightly erythremic base. Otherwise eyes grossly normal to inspection, PERRL.              "

## 2022-01-03 NOTE — TELEPHONE ENCOUNTER
"Sty has been reoccuring, want's to be seen    Reason for Disposition    Sty on eyelid    Answer Assessment - Initial Assessment Questions  1. LOCATION: \"Which eye has the sty?\" \"Upper or lower eyelid?\"      Lower left eye lid  2. SIZE: \"How big is it?\" (Note: standard pencil eraser is 6 mm)      Pimple size  3. EYELID: \"Is the eyelid swollen?\" If so, ask: \"How much?\"      no  4. REDNESS: \"Has the redness spread onto the eyelid?\"      Pink on the inside of lid  5. ONSET: \"When did you notice the sty?\"      4 days ago  6. VISION: \"Do you have blurred vision?\"       no  7. PAIN: \"Is it painful?\" If so, ask: \"How bad is the pain?\"  (Scale 1-10; or mild, moderate, severe)      no  8. CONTACTS: \"Do you wear contacts?\"      no  9. OTHER SYMPTOMS: \"Do you have any other symptoms?\" (e.g., fever)      no  10. PREGNANCY: \"Is there any chance you are pregnant?\" \"When was your last menstrual period?\"        no    Protocols used: AMBER      "

## 2022-01-10 ENCOUNTER — MYC MEDICAL ADVICE (OUTPATIENT)
Dept: OTHER | Facility: HOSPITAL | Age: 67
End: 2022-01-10
Payer: COMMERCIAL

## 2022-02-07 ENCOUNTER — LAB (OUTPATIENT)
Dept: LAB | Facility: OTHER | Age: 67
End: 2022-02-07
Payer: MEDICARE

## 2022-02-07 DIAGNOSIS — Z00.00 ROUTINE GENERAL MEDICAL EXAMINATION AT A HEALTH CARE FACILITY: ICD-10-CM

## 2022-02-07 DIAGNOSIS — Z80.42 FAMILY HISTORY OF MALIGNANT NEOPLASM OF PROSTATE: ICD-10-CM

## 2022-02-07 DIAGNOSIS — E78.00 PURE HYPERCHOLESTEROLEMIA: ICD-10-CM

## 2022-02-07 DIAGNOSIS — R97.20 ELEVATED PROSTATE SPECIFIC ANTIGEN (PSA): ICD-10-CM

## 2022-02-07 LAB
ALBUMIN SERPL-MCNC: 4.3 G/DL (ref 3.4–5)
ALP SERPL-CCNC: 67 U/L (ref 40–150)
ALT SERPL W P-5'-P-CCNC: 27 U/L (ref 0–70)
ANION GAP SERPL CALCULATED.3IONS-SCNC: 6 MMOL/L (ref 3–14)
AST SERPL W P-5'-P-CCNC: 19 U/L (ref 0–45)
BASOPHILS # BLD AUTO: 0 10E3/UL (ref 0–0.2)
BASOPHILS NFR BLD AUTO: 1 %
BILIRUB SERPL-MCNC: 1.1 MG/DL (ref 0.2–1.3)
BUN SERPL-MCNC: 18 MG/DL (ref 7–30)
CALCIUM SERPL-MCNC: 9.3 MG/DL (ref 8.5–10.1)
CHLORIDE BLD-SCNC: 105 MMOL/L (ref 94–109)
CHOLEST SERPL-MCNC: 185 MG/DL
CO2 SERPL-SCNC: 26 MMOL/L (ref 20–32)
CREAT SERPL-MCNC: 0.98 MG/DL (ref 0.66–1.25)
EOSINOPHIL # BLD AUTO: 0.1 10E3/UL (ref 0–0.7)
EOSINOPHIL NFR BLD AUTO: 1 %
ERYTHROCYTE [DISTWIDTH] IN BLOOD BY AUTOMATED COUNT: 12.9 % (ref 10–15)
FASTING STATUS PATIENT QL REPORTED: YES
GFR SERPL CREATININE-BSD FRML MDRD: 85 ML/MIN/1.73M2
GLUCOSE BLD-MCNC: 89 MG/DL (ref 70–99)
HCT VFR BLD AUTO: 46.6 % (ref 40–53)
HDLC SERPL-MCNC: 64 MG/DL
HGB BLD-MCNC: 15.3 G/DL (ref 13.3–17.7)
IMM GRANULOCYTES # BLD: 0 10E3/UL
IMM GRANULOCYTES NFR BLD: 0 %
LDLC SERPL CALC-MCNC: 110 MG/DL
LYMPHOCYTES # BLD AUTO: 1.3 10E3/UL (ref 0.8–5.3)
LYMPHOCYTES NFR BLD AUTO: 27 %
MCH RBC QN AUTO: 32.5 PG (ref 26.5–33)
MCHC RBC AUTO-ENTMCNC: 32.8 G/DL (ref 31.5–36.5)
MCV RBC AUTO: 99 FL (ref 78–100)
MONOCYTES # BLD AUTO: 0.5 10E3/UL (ref 0–1.3)
MONOCYTES NFR BLD AUTO: 11 %
NEUTROPHILS # BLD AUTO: 2.8 10E3/UL (ref 1.6–8.3)
NEUTROPHILS NFR BLD AUTO: 60 %
NONHDLC SERPL-MCNC: 121 MG/DL
NRBC # BLD AUTO: 0 10E3/UL
NRBC BLD AUTO-RTO: 0 /100
PLATELET # BLD AUTO: 212 10E3/UL (ref 150–450)
POTASSIUM BLD-SCNC: 4.5 MMOL/L (ref 3.4–5.3)
PROT SERPL-MCNC: 7.8 G/DL (ref 6.8–8.8)
PSA SERPL-MCNC: 2.15 UG/L (ref 0–4)
RBC # BLD AUTO: 4.71 10E6/UL (ref 4.4–5.9)
SODIUM SERPL-SCNC: 137 MMOL/L (ref 133–144)
TRIGL SERPL-MCNC: 55 MG/DL
WBC # BLD AUTO: 4.7 10E3/UL (ref 4–11)

## 2022-02-07 PROCEDURE — 85025 COMPLETE CBC W/AUTO DIFF WBC: CPT | Mod: ZL

## 2022-02-07 PROCEDURE — 84153 ASSAY OF PSA TOTAL: CPT | Mod: ZL

## 2022-02-07 PROCEDURE — 36415 COLL VENOUS BLD VENIPUNCTURE: CPT | Mod: ZL

## 2022-02-07 PROCEDURE — 82040 ASSAY OF SERUM ALBUMIN: CPT | Mod: ZL

## 2022-02-07 PROCEDURE — 80053 COMPREHEN METABOLIC PANEL: CPT | Mod: ZL

## 2022-02-07 PROCEDURE — 80061 LIPID PANEL: CPT | Mod: ZL

## 2022-02-09 ENCOUNTER — OFFICE VISIT (OUTPATIENT)
Dept: FAMILY MEDICINE | Facility: OTHER | Age: 67
End: 2022-02-09
Attending: FAMILY MEDICINE
Payer: COMMERCIAL

## 2022-02-09 VITALS
DIASTOLIC BLOOD PRESSURE: 88 MMHG | OXYGEN SATURATION: 99 % | RESPIRATION RATE: 20 BRPM | SYSTOLIC BLOOD PRESSURE: 168 MMHG | BODY MASS INDEX: 26.54 KG/M2 | HEART RATE: 100 BPM | WEIGHT: 185 LBS

## 2022-02-09 DIAGNOSIS — N52.8 OTHER MALE ERECTILE DYSFUNCTION: ICD-10-CM

## 2022-02-09 DIAGNOSIS — Z83.719 FAMILY HISTORY OF COLONIC POLYPS: ICD-10-CM

## 2022-02-09 DIAGNOSIS — B35.3 TINEA PEDIS OF BOTH FEET: ICD-10-CM

## 2022-02-09 DIAGNOSIS — F41.8 SITUATIONAL ANXIETY: ICD-10-CM

## 2022-02-09 DIAGNOSIS — R03.0 ELEVATED BLOOD PRESSURE READING WITHOUT DIAGNOSIS OF HYPERTENSION: Primary | ICD-10-CM

## 2022-02-09 DIAGNOSIS — N52.01 ERECTILE DYSFUNCTION DUE TO ARTERIAL INSUFFICIENCY: ICD-10-CM

## 2022-02-09 DIAGNOSIS — R35.1 NOCTURIA: ICD-10-CM

## 2022-02-09 DIAGNOSIS — L30.0 NUMMULAR ECZEMATOUS DERMATITIS: ICD-10-CM

## 2022-02-09 PROCEDURE — 99214 OFFICE O/P EST MOD 30 MIN: CPT | Performed by: FAMILY MEDICINE

## 2022-02-09 RX ORDER — KETOCONAZOLE 20 MG/G
CREAM TOPICAL 2 TIMES DAILY
Qty: 15 G | Refills: 1 | Status: SHIPPED | OUTPATIENT
Start: 2022-02-09 | End: 2023-02-10

## 2022-02-09 RX ORDER — SILDENAFIL 100 MG/1
100 TABLET, FILM COATED ORAL DAILY PRN
Qty: 6 TABLET | Refills: 11 | Status: SHIPPED | OUTPATIENT
Start: 2022-02-09 | End: 2023-02-10

## 2022-02-09 RX ORDER — LORAZEPAM 1 MG/1
1 TABLET ORAL EVERY 8 HOURS PRN
Qty: 20 TABLET | Refills: 0 | Status: SHIPPED | OUTPATIENT
Start: 2022-02-09 | End: 2023-02-10

## 2022-02-09 RX ORDER — PRENATAL VIT 91/IRON/FOLIC/DHA 28-975-200
COMBINATION PACKAGE (EA) ORAL 2 TIMES DAILY
Qty: 30 G | Refills: 1 | Status: SHIPPED | OUTPATIENT
Start: 2022-02-09

## 2022-02-09 ASSESSMENT — PAIN SCALES - GENERAL: PAINLEVEL: NO PAIN (0)

## 2022-02-09 ASSESSMENT — ACTIVITIES OF DAILY LIVING (ADL): CURRENT_FUNCTION: NO ASSISTANCE NEEDED

## 2022-02-09 NOTE — NURSING NOTE
"Chief Complaint   Patient presents with     Physical       Initial BP (!) 168/88 (BP Location: Right arm, Patient Position: Sitting, Cuff Size: Adult Regular)   Pulse 100   Resp 20   Wt 83.9 kg (185 lb)   SpO2 99%   BMI 26.54 kg/m   Estimated body mass index is 26.54 kg/m  as calculated from the following:    Height as of 1/3/22: 1.778 m (5' 10\").    Weight as of this encounter: 83.9 kg (185 lb).  Medication Reconciliation: complete  Elio Newton LPN  "

## 2022-02-09 NOTE — NURSING NOTE
"Chief Complaint   Patient presents with     Physical       Initial BP (!) 158/105 (BP Location: Right arm, Patient Position: Sitting, Cuff Size: Adult Regular)   Pulse 100   Resp 20   Wt 83.9 kg (185 lb)   SpO2 99%   BMI 26.54 kg/m   Estimated body mass index is 26.54 kg/m  as calculated from the following:    Height as of 1/3/22: 1.778 m (5' 10\").    Weight as of this encounter: 83.9 kg (185 lb).  Medication Reconciliation: complete  Elio Newton LPN  "

## 2022-02-09 NOTE — PROGRESS NOTES
"SUBJECTIVE:   Zeus Ford is a 66 year old male who presents for Preventive Visit.      Patient has been advised of split billing requirements and indicates understanding: Yes  Are you in the first 12 months of your Medicare coverage?  No    Healthy Habits:     In general, how would you rate your overall health?  Excellent    Frequency of exercise:  4-5 days/week    Duration of exercise:  45-60 minutes    Do you usually eat at least 4 servings of fruit and vegetables a day, include whole grains    & fiber and avoid regularly eating high fat or \"junk\" foods?  No    Taking medications regularly:  Yes    Medication side effects:  None    Ability to successfully perform activities of daily living:  No assistance needed    Home Safety:  Throw rugs in the hallway    Hearing Impairment:  No hearing concerns    In the past 6 months, have you been bothered by leaking of urine?  No    In general, how would you rate your overall mental or emotional health?  Excellent      PHQ-2 Total Score: 0    Additional concerns today:  No         Do you feel safe in your environment? Yes    Have you ever done Advance Care Planning? (For example, a Health Directive, POLST, or a discussion with a medical provider or your loved ones about your wishes): Yes, patient states has an Advance Care Planning document and will bring a copy to the clinic.       Fall risk  Fallen 2 or more times in the past year?: No  Any fall with injury in the past year?: No    Cognitive Screening   1) Repeat 3 items (Leader, Season, Table)    2) Clock draw: NORMAL  3) 3 item recall: Recalls 3 objects  Results: 3 items recalled: COGNITIVE IMPAIRMENT LESS LIKELY    Mini-CogTM Copyright SHIRA Leal. Licensed by the author for use in [facility]; reprinted with permission (shadia@.[email or web address]). All rights reserved.      Do you have sleep apnea, excessive snoring or daytime drowsiness?: no    Reviewed and updated as needed this visit by clinical staff  Tobacco  " "Allergies  Meds   Med Hx  Surg Hx  Fam Hx  Soc Hx       Reviewed and updated as needed this visit by Provider               Social History     Tobacco Use     Smoking status: Never Smoker     Smokeless tobacco: Never Used   Substance Use Topics     Alcohol use: Yes     Comment: rarely      If you drink alcohol do you typically have >3 drinks per day or >7 drinks per week? No    Alcohol Use 2/9/2022   Prescreen: >3 drinks/day or >7 drinks/week? No           Hyperlipidemia Follow-Up      Are you regularly taking any medication or supplement to lower your cholesterol?   No    Are you having muscle aches or other side effects that you think could be caused by your cholesterol lowering medication?  No      Current providers sharing in care for this patient include:   Patient Care Team:  Cirilo Landeros MD as PCP - General  Cirilo Landeros MD as Assigned PCP    The following health maintenance items are reviewed in Epic and correct as of today:  Health Maintenance Due   Topic Date Due     HEPATITIS C SCREENING  Never done     AORTIC ANEURYSM SCREENING (SYSTEM ASSIGNED)  Never done     FALL RISK ASSESSMENT  02/08/2022     MEDICARE ANNUAL WELLNESS VISIT  02/08/2022     Labs reviewed in EPIC          Review of Systems  Constitutional, HEENT, cardiovascular, pulmonary, GI, , musculoskeletal, neuro, skin, endocrine and psych systems are negative, except as otherwise noted.    OBJECTIVE:   BP (!) 168/88 (BP Location: Right arm, Patient Position: Sitting, Cuff Size: Adult Regular)   Pulse 100   Resp 20   Wt 83.9 kg (185 lb)   SpO2 99%   BMI 26.54 kg/m   Estimated body mass index is 26.54 kg/m  as calculated from the following:    Height as of 1/3/22: 1.778 m (5' 10\").    Weight as of this encounter: 83.9 kg (185 lb).  Physical Exam  GENERAL: healthy, alert and no distress  EYES: Eyes grossly normal to inspection, PERRL and conjunctivae and sclerae normal  HENT: ear canals and TM's normal, nose and mouth without " "ulcers or lesions  NECK: no adenopathy, no asymmetry, masses, or scars and thyroid normal to palpation  RESP: lungs clear to auscultation - no rales, rhonchi or wheezes  CV: regular rate and rhythm, normal S1 S2, no S3 or S4, no murmur, click or rub, no peripheral edema and peripheral pulses strong  ABDOMEN: soft, nontender, no hepatosplenomegaly, no masses and bowel sounds normal   (male): normal male genitalia without lesions or urethral discharge, no hernia  MS: no gross musculoskeletal defects noted, no edema  SKIN: no suspicious lesions or rashes  NEURO: Normal strength and tone, mentation intact and speech normal  PSYCH: mentation appears normal, affect normal/bright  LYMPH: no cervical, supraclavicular, axillary, or inguinal adenopathy        ASSESSMENT / PLAN:       ICD-10-CM    1. Elevated BP/Pulse -white coat syndrome.  R03.0    2. Nocturia  R35.1    3. Other male erectile dysfunction  N52.8    4. Family history of colonic polyps  Z83.71      Doing great.  UTD on immunizations and colonoscopy. Labs ok.  Continue current medications and behavioral changes.   See back in  A year.  Pt checks BP frequently at home and numbers much better. Pt to keep checking.  RF on meds given       COUNSELING:  Reviewed preventive health counseling, as reflected in patient instructions       Regular exercise       Healthy diet/nutrition       Colon cancer screening       Prostate cancer screening    Estimated body mass index is 26.54 kg/m  as calculated from the following:    Height as of 1/3/22: 1.778 m (5' 10\").    Weight as of this encounter: 83.9 kg (185 lb).        He reports that he has never smoked. He has never used smokeless tobacco.      Appropriate preventive services were discussed with this patient, including applicable screening as appropriate for cardiovascular disease, diabetes, osteopenia/osteoporosis, and glaucoma.  As appropriate for age/gender, discussed screening for colorectal cancer, prostate cancer, " breast cancer, and cervical cancer. Checklist reviewing preventive services available has been given to the patient.    Reviewed patients plan of care and provided an AVS. The Basic Care Plan (routine screening as documented in Health Maintenance) for Zeus meets the Care Plan requirement. This Care Plan has been established and reviewed with the Patient.    Counseling Resources:  ATP IV Guidelines  Pooled Cohorts Equation Calculator  Breast Cancer Risk Calculator  Breast Cancer: Medication to Reduce Risk  FRAX Risk Assessment  ICSI Preventive Guidelines  Dietary Guidelines for Americans, 2010  USDA's MyPlate  ASA Prophylaxis  Lung CA Screening    Cirilo Landeros MD  [facility] - [place]    Identified Health Risks:

## 2022-09-04 ENCOUNTER — HEALTH MAINTENANCE LETTER (OUTPATIENT)
Age: 67
End: 2022-09-04

## 2022-09-27 ENCOUNTER — TELEPHONE (OUTPATIENT)
Dept: FAMILY MEDICINE | Facility: OTHER | Age: 67
End: 2022-09-27

## 2022-09-27 NOTE — TELEPHONE ENCOUNTER
"Call placed to patient to discuss positive covid 19 results. Patient testing positive on 9/25/22 via at home testing.    Symptoms to include:  Sinus, headache and body aches. Symptoms are improving.     Start of Symptoms: 9/24/22    Covid 19 Vaccination Status:  Fully vaccinated x 2 Moderna- Booster x 2- Moderna.     Underlying Medical Conditions: Age, anxiety     Are you pregnant, breastfeeding or trying to conceive? No     Appointment scheduled: patient choosing to not receive tx at this time as symptoms are improving. Patient will return call on 9/28/22 or 9/29/22 if symptoms worsen and patient feels he needs antiviral tx.     Instructions below provided to patient. Patient verbalized understanding.     Instructions for Patients  Your COVID-19 test was positive. This means you have the virus. Please follow the \"How can I take care of myself\" and \"How do I self-isolate?\" guidelines in these instructions.    What treatments are available?  Over-the-counter medicines may help with your symptoms such as runny or stuffy nose, cough, chills, and fever. Talk to your care team about your options.     Some people are at high risk for severe illness (for example if you have a weak immune system, you're 65 or older, or you have certain medical problems). If your risk it high and your symptoms started in the last 5 to 7 days, we strongly recommend for you to get COVID treatment as soon as possible before you get really sick. Paxlovid, Molnupiravir and the monoclonal antibody treatments are proven safe and effective, make you feel better faster, and prevent hospitalization and death.       What are the symptoms of COVID-19?  Symptoms can include fever, cough, shortness of breath, chills, headache, muscle pain sore throat, fatigue, runny or stuffy nose, and loss of taste and smell. Other less common symptoms include nausea, vomiting, or diarrhea (watery stools).    Know when to call 911. Emergency warning signs " include:    Trouble breathing or shortness of breath    Pain or pressure in the chest that doesn't go away    Feeling confused like you haven't felt before, or not being able to wake up    Bluish-colored lips or face    How can I take care of myself?  1. Get lots of rest. Drink extra fluids (unless a doctor has told you not to).  2. Take Tylenol (acetaminophen) for fever or pain. If you have liver or kidney problems, ask your family doctor if it's okay to take Tylenol   Adults:   650 mg (two 325 mg pills or tablets) every 4 to 6 hours, or...   1,000 mg (two 500 mg pills or tablets) every 8 hours as needed.  Note: Don't take more than 3,000 mg in one day. Acetaminophen is found in many medicines (both prescribed and over the counter). Read all labels to be sure you don't take too much.  For children, check the Tylenol bottle for the right dose. The dose is based on the child's age or weight.  3. Take over the counter medicines for your symptoms as needed. Talk to your pharmacist.  4. If you have other health problems (like cancer, heart failure, an organ transplant, or severe kidney disease): Call your specialty clinic if you don't feel better in the next 2 days.    These guidelines are for isolating and quarantining before returning to work, school or .     For employers, schools and day cares: This is an official notice for this person's medical guidelines for returning in-person.     For health care sites: The CDC gives different isolation and quarantine guidelines for healthcare sites, please check with these sites before arriving.     How do I self-isolate?  You isolate when you have symptoms of COVID or a test shows you have COVID, even if you don't have symptoms.     If you DO have symptoms:  o Stay home and away from others  - For at least 5 days after your symptoms started, AND   - You are fever free for 24 hours (with no medicine that reduces fever), AND  - Your other symptoms are better.  o Wear a  mask for 10 full days any time you are around others.    If you DON'T have symptoms:  o Stay at home and away from others for at least 5 days after your positive test.  o Wear a mask for 10 full days any time you are around others.    How and when do I quarantine?  You quarantine when you may have been exposed to the virus and DON'T have symptoms.     Stay home and away from others.     You must quarantine for 5 days after your last contact with a person who has COVID.  o Note: If you are fully vaccinated, you don't need to quarantine. You should still follow the steps below.     Wear a mask for 10 full days any time you're around others.    Get tested at least 5 days after you were exposed, even if you don't have symptoms.     If you start to have symptoms, isolate right away and get tested.    Where can I get more information?    [facility] COVID-19 Resource Hub: www.[email or web address] [facility].org/covid19/     CDC Quarantine & Isolation: https://www.cdc.gov/coronavirus/2019-ncov/your-health/quarantine-isolation.html     CDC - What to Do If You're Sick: https://www.cdc.gov/coronavirus/2019-ncov/if-you-are-sick/index.html    [facility] clinical trials (COVID-19 research studies): clinicalaffairs.[facility]/umn-clinical-trials    [place] Department of Health COVID-19 Public Hotline: 1-546.729.1771

## 2022-12-12 ENCOUNTER — TELEPHONE (OUTPATIENT)
Dept: FAMILY MEDICINE | Facility: OTHER | Age: 67
End: 2022-12-12

## 2022-12-12 NOTE — TELEPHONE ENCOUNTER
10:12 AM    Reason for Call: Phone Call    Description: Patient schedule a physical with Dr. Landeros for Feb 10,2023 and would like to schedule his labs for Feb 9, 2023.  Please call him back to let him know if it can be done and he can schedule it.    Was an appointment offered for this call? No  If yes : Appointment type              Date    Preferred method for responding to this message: Telephone Call  What is your phone number ? [phone]    If we cannot reach you directly, may we leave a detailed response at the number you provided? Yes    Can this message wait until your PCP/provider returns, if available today? Provider is in today    CARO LUCAS

## 2022-12-13 DIAGNOSIS — R35.1 NOCTURIA: ICD-10-CM

## 2022-12-13 DIAGNOSIS — R03.0 ELEVATED BLOOD PRESSURE READING WITHOUT DIAGNOSIS OF HYPERTENSION: ICD-10-CM

## 2022-12-13 DIAGNOSIS — R97.20 ELEVATED PROSTATE SPECIFIC ANTIGEN (PSA): ICD-10-CM

## 2022-12-13 DIAGNOSIS — E78.00 PURE HYPERCHOLESTEROLEMIA: Primary | ICD-10-CM

## 2023-01-15 ENCOUNTER — HEALTH MAINTENANCE LETTER (OUTPATIENT)
Age: 68
End: 2023-01-15

## 2023-02-09 ENCOUNTER — LAB (OUTPATIENT)
Dept: LAB | Facility: OTHER | Age: 68
End: 2023-02-09
Payer: MEDICARE

## 2023-02-09 DIAGNOSIS — R03.0 ELEVATED BLOOD PRESSURE READING WITHOUT DIAGNOSIS OF HYPERTENSION: ICD-10-CM

## 2023-02-09 DIAGNOSIS — E78.00 PURE HYPERCHOLESTEROLEMIA: ICD-10-CM

## 2023-02-09 DIAGNOSIS — R97.20 ELEVATED PROSTATE SPECIFIC ANTIGEN (PSA): ICD-10-CM

## 2023-02-09 DIAGNOSIS — R35.1 NOCTURIA: ICD-10-CM

## 2023-02-09 LAB
ALBUMIN SERPL BCG-MCNC: 4.5 G/DL (ref 3.5–5.2)
ALP SERPL-CCNC: 76 U/L (ref 40–129)
ALT SERPL W P-5'-P-CCNC: 19 U/L (ref 10–50)
ANION GAP SERPL CALCULATED.3IONS-SCNC: 11 MMOL/L (ref 7–15)
AST SERPL W P-5'-P-CCNC: 27 U/L (ref 10–50)
BASOPHILS # BLD AUTO: 0.1 10E3/UL (ref 0–0.2)
BASOPHILS NFR BLD AUTO: 1 %
BILIRUB SERPL-MCNC: 0.9 MG/DL
BUN SERPL-MCNC: 15.2 MG/DL (ref 8–23)
CALCIUM SERPL-MCNC: 9.7 MG/DL (ref 8.8–10.2)
CHLORIDE SERPL-SCNC: 102 MMOL/L (ref 98–107)
CHOLEST SERPL-MCNC: 194 MG/DL
CREAT SERPL-MCNC: 0.95 MG/DL (ref 0.67–1.17)
DEPRECATED HCO3 PLAS-SCNC: 24 MMOL/L (ref 22–29)
EOSINOPHIL # BLD AUTO: 0 10E3/UL (ref 0–0.7)
EOSINOPHIL NFR BLD AUTO: 1 %
ERYTHROCYTE [DISTWIDTH] IN BLOOD BY AUTOMATED COUNT: 12.8 % (ref 10–15)
GFR SERPL CREATININE-BSD FRML MDRD: 88 ML/MIN/1.73M2
GLUCOSE SERPL-MCNC: 88 MG/DL (ref 70–99)
HCT VFR BLD AUTO: 46.1 % (ref 40–53)
HDLC SERPL-MCNC: 63 MG/DL
HGB BLD-MCNC: 15.3 G/DL (ref 13.3–17.7)
IMM GRANULOCYTES # BLD: 0 10E3/UL
IMM GRANULOCYTES NFR BLD: 0 %
LDLC SERPL CALC-MCNC: 121 MG/DL
LYMPHOCYTES # BLD AUTO: 1.1 10E3/UL (ref 0.8–5.3)
LYMPHOCYTES NFR BLD AUTO: 23 %
MCH RBC QN AUTO: 32.5 PG (ref 26.5–33)
MCHC RBC AUTO-ENTMCNC: 33.2 G/DL (ref 31.5–36.5)
MCV RBC AUTO: 98 FL (ref 78–100)
MONOCYTES # BLD AUTO: 0.4 10E3/UL (ref 0–1.3)
MONOCYTES NFR BLD AUTO: 9 %
NEUTROPHILS # BLD AUTO: 3 10E3/UL (ref 1.6–8.3)
NEUTROPHILS NFR BLD AUTO: 66 %
NONHDLC SERPL-MCNC: 131 MG/DL
NRBC # BLD AUTO: 0 10E3/UL
NRBC BLD AUTO-RTO: 0 /100
PLATELET # BLD AUTO: 204 10E3/UL (ref 150–450)
POTASSIUM SERPL-SCNC: 4.7 MMOL/L (ref 3.4–5.3)
PROT SERPL-MCNC: 7.5 G/DL (ref 6.4–8.3)
PSA SERPL-MCNC: 1.87 NG/ML (ref 0–4.5)
RBC # BLD AUTO: 4.71 10E6/UL (ref 4.4–5.9)
SODIUM SERPL-SCNC: 137 MMOL/L (ref 136–145)
TRIGL SERPL-MCNC: 51 MG/DL
TSH SERPL DL<=0.005 MIU/L-ACNC: 2.22 UIU/ML (ref 0.3–4.2)
WBC # BLD AUTO: 4.6 10E3/UL (ref 4–11)

## 2023-02-09 PROCEDURE — 84443 ASSAY THYROID STIM HORMONE: CPT | Mod: ZL

## 2023-02-09 PROCEDURE — 80053 COMPREHEN METABOLIC PANEL: CPT | Mod: ZL

## 2023-02-09 PROCEDURE — 84153 ASSAY OF PSA TOTAL: CPT | Mod: ZL

## 2023-02-09 PROCEDURE — 85025 COMPLETE CBC W/AUTO DIFF WBC: CPT | Mod: ZL

## 2023-02-09 PROCEDURE — 80061 LIPID PANEL: CPT | Mod: ZL

## 2023-02-09 PROCEDURE — 36415 COLL VENOUS BLD VENIPUNCTURE: CPT | Mod: ZL

## 2023-02-10 ENCOUNTER — OFFICE VISIT (OUTPATIENT)
Dept: FAMILY MEDICINE | Facility: OTHER | Age: 68
End: 2023-02-10
Attending: FAMILY MEDICINE
Payer: COMMERCIAL

## 2023-02-10 VITALS
WEIGHT: 191 LBS | BODY MASS INDEX: 27.41 KG/M2 | RESPIRATION RATE: 16 BRPM | SYSTOLIC BLOOD PRESSURE: 150 MMHG | OXYGEN SATURATION: 97 % | TEMPERATURE: 96.5 F | DIASTOLIC BLOOD PRESSURE: 82 MMHG | HEART RATE: 94 BPM

## 2023-02-10 DIAGNOSIS — L30.0 NUMMULAR ECZEMATOUS DERMATITIS: ICD-10-CM

## 2023-02-10 DIAGNOSIS — R35.1 NOCTURIA: ICD-10-CM

## 2023-02-10 DIAGNOSIS — F41.8 SITUATIONAL ANXIETY: ICD-10-CM

## 2023-02-10 DIAGNOSIS — H60.393 INFECTIVE OTITIS EXTERNA, BILATERAL: ICD-10-CM

## 2023-02-10 DIAGNOSIS — N52.01 ERECTILE DYSFUNCTION DUE TO ARTERIAL INSUFFICIENCY: ICD-10-CM

## 2023-02-10 DIAGNOSIS — Z80.42 FAMILY HISTORY OF MALIGNANT NEOPLASM OF PROSTATE: ICD-10-CM

## 2023-02-10 DIAGNOSIS — R03.0 WHITE COAT SYNDROME WITH HIGH BLOOD PRESSURE WITHOUT HYPERTENSION: ICD-10-CM

## 2023-02-10 DIAGNOSIS — Z83.719 FAMILY HISTORY OF COLONIC POLYPS: ICD-10-CM

## 2023-02-10 DIAGNOSIS — L91.8 SKIN TAG: ICD-10-CM

## 2023-02-10 DIAGNOSIS — E78.00 PURE HYPERCHOLESTEROLEMIA: ICD-10-CM

## 2023-02-10 DIAGNOSIS — R03.0 ELEVATED BLOOD PRESSURE READING WITHOUT DIAGNOSIS OF HYPERTENSION: Primary | ICD-10-CM

## 2023-02-10 PROCEDURE — G0463 HOSPITAL OUTPT CLINIC VISIT: HCPCS

## 2023-02-10 PROCEDURE — 99214 OFFICE O/P EST MOD 30 MIN: CPT | Mod: 25 | Performed by: FAMILY MEDICINE

## 2023-02-10 PROCEDURE — 99397 PER PM REEVAL EST PAT 65+ YR: CPT | Mod: 25 | Performed by: FAMILY MEDICINE

## 2023-02-10 PROCEDURE — 11200 RMVL SKIN TAGS UP TO&INC 15: CPT | Performed by: FAMILY MEDICINE

## 2023-02-10 RX ORDER — TRIAMCINOLONE ACETONIDE 1 MG/G
CREAM TOPICAL
Qty: 30 G | Refills: 3 | Status: SHIPPED | OUTPATIENT
Start: 2023-02-10 | End: 2024-02-15

## 2023-02-10 RX ORDER — KETOCONAZOLE 20 MG/G
CREAM TOPICAL 2 TIMES DAILY
Qty: 15 G | Refills: 1 | Status: SHIPPED | OUTPATIENT
Start: 2023-02-10 | End: 2024-02-15

## 2023-02-10 RX ORDER — NEOMYCIN SULFATE, POLYMYXIN B SULFATE, HYDROCORTISONE 3.5; 10000; 1 MG/ML; [USP'U]/ML; MG/ML
3 SOLUTION/ DROPS AURICULAR (OTIC) 4 TIMES DAILY
Qty: 10 ML | Refills: 3 | Status: SHIPPED | OUTPATIENT
Start: 2023-02-10 | End: 2024-02-15

## 2023-02-10 RX ORDER — SILDENAFIL 100 MG/1
100 TABLET, FILM COATED ORAL DAILY PRN
Qty: 6 TABLET | Refills: 11 | Status: SHIPPED | OUTPATIENT
Start: 2023-02-10 | End: 2024-02-15

## 2023-02-10 RX ORDER — LORAZEPAM 1 MG/1
1 TABLET ORAL EVERY 8 HOURS PRN
Qty: 20 TABLET | Refills: 0 | Status: SHIPPED | OUTPATIENT
Start: 2023-02-10 | End: 2024-02-15

## 2023-02-10 ASSESSMENT — ENCOUNTER SYMPTOMS
COUGH: 0
CHILLS: 0
DYSURIA: 0
HEMATURIA: 0
PARESTHESIAS: 0
DIARRHEA: 0
WEAKNESS: 0
CONSTIPATION: 0
DIZZINESS: 0
SORE THROAT: 0
HEMATOCHEZIA: 0
PALPITATIONS: 0
JOINT SWELLING: 0
HEARTBURN: 0
MYALGIAS: 0
NERVOUS/ANXIOUS: 0
FREQUENCY: 0
EYE PAIN: 0
SHORTNESS OF BREATH: 0
NAUSEA: 0
HEADACHES: 0
ABDOMINAL PAIN: 0
ARTHRALGIAS: 0
FEVER: 0

## 2023-02-10 ASSESSMENT — ACTIVITIES OF DAILY LIVING (ADL): CURRENT_FUNCTION: NO ASSISTANCE NEEDED

## 2023-02-10 ASSESSMENT — PAIN SCALES - GENERAL: PAINLEVEL: NO PAIN (0)

## 2023-02-10 NOTE — PROGRESS NOTES
"SUBJECTIVE:   Zeus is a 67 year old who presents for Preventive Visit.  Patient has been advised of split billing requirements and indicates understanding: Yes  Are you in the first 12 months of your Medicare coverage?  No    Healthy Habits:     In general, how would you rate your overall health?  Excellent    Frequency of exercise:  4-5 days/week    Duration of exercise:  45-60 minutes    Do you usually eat at least 4 servings of fruit and vegetables a day, include whole grains    & fiber and avoid regularly eating high fat or \"junk\" foods?  Yes    Taking medications regularly:  Yes    Medication side effects:  None    Ability to successfully perform activities of daily living:  No assistance needed    Home Safety:  No safety concerns identified    Hearing Impairment:  No hearing concerns    In the past 6 months, have you been bothered by leaking of urine?  No    In general, how would you rate your overall mental or emotional health?  Excellent      PHQ-2 Total Score: 0    Additional concerns today:  No      Have you ever done Advance Care Planning? (For example, a Health Directive, POLST, or a discussion with a medical provider or your loved ones about your wishes): Yes, patient states has an Advance Care Planning document and will bring a copy to the clinic.       Fall risk  Fallen 2 or more times in the past year?: No  Any fall with injury in the past year?: No    Cognitive Screening   1) Repeat 3 items (Leader, Season, Table)    2) Clock draw: NORMAL  3) 3 item recall: Recalls 2 objects   Results: NORMAL clock, 1-2 items recalled: COGNITIVE IMPAIRMENT LESS LIKELY    Mini-CogTM Copyright SHIRA Leal. Licensed by the author for use in [facility]; reprinted with permission (shadia@.[facility]). All rights reserved.      Do you have sleep apnea, excessive snoring or daytime drowsiness?: no    Reviewed and updated as needed this visit by clinical staff   Tobacco  Allergies  Meds              Reviewed and " updated as needed this visit by Provider                 Social History     Tobacco Use     Smoking status: Never     Smokeless tobacco: Never   Substance Use Topics     Alcohol use: Yes     Comment: rarely      If you drink alcohol do you typically have >3 drinks per day or >7 drinks per week? No    Alcohol Use 2/10/2023   Prescreen: >3 drinks/day or >7 drinks/week? No   Prescreen: >3 drinks/day or >7 drinks/week? -           Hyperlipidemia Follow-Up      Are you regularly taking any medication or supplement to lower your cholesterol?   No    Are you having muscle aches or other side effects that you think could be caused by your cholesterol lowering medication?  No      Current providers sharing in care for this patient include:   Patient Care Team:  Cirilo Landeros MD as PCP - General  Cirilo Landeros MD as Assigned PCP    The following health maintenance items are reviewed in Epic and correct as of today:  Health Maintenance   Topic Date Due     HEPATITIS C SCREENING  Never done     MEDICARE ANNUAL WELLNESS VISIT  06/06/2020     AORTIC ANEURYSM SCREENING (SYSTEM ASSIGNED)  Never done     Pneumococcal Vaccine: 65+ Years (2 - PCV) 02/08/2022     LIPID  02/09/2024     FALL RISK ASSESSMENT  02/10/2024     DTAP/TDAP/TD IMMUNIZATION (4 - Td or Tdap) 02/11/2025     COLORECTAL CANCER SCREENING  04/10/2025     ADVANCE CARE PLANNING  02/10/2028     PHQ-2 (once per calendar year)  Completed     INFLUENZA VACCINE  Completed     ZOSTER IMMUNIZATION  Completed     COVID-19 Vaccine  Completed     IPV IMMUNIZATION  Aged Out     MENINGITIS IMMUNIZATION  Aged Out     Labs reviewed in EPIC          Review of Systems   Constitutional: Negative for chills and fever.   HENT: Negative for congestion, ear pain, hearing loss and sore throat.    Eyes: Negative for pain and visual disturbance.   Respiratory: Negative for cough and shortness of breath.    Cardiovascular: Negative for chest pain, palpitations and peripheral edema.  "  Gastrointestinal: Negative for abdominal pain, constipation, diarrhea, heartburn, hematochezia and nausea.   Genitourinary: Negative for dysuria, frequency, genital sores, hematuria, impotence, penile discharge and urgency.   Musculoskeletal: Negative for arthralgias, joint swelling and myalgias.   Skin: Negative for rash.   Neurological: Negative for dizziness, weakness, headaches and paresthesias.   Psychiatric/Behavioral: Negative for mood changes. The patient is not nervous/anxious.      Constitutional, HEENT, cardiovascular, pulmonary, GI, , musculoskeletal, neuro, skin, endocrine and psych systems are negative, except as otherwise noted.    OBJECTIVE:   BP (!) 150/82 (BP Location: Left arm, Patient Position: Sitting, Cuff Size: Adult Large)   Pulse 94   Temp (!) 96.5  F (35.8  C) (Tympanic)   Resp 16   Wt 86.6 kg (191 lb)   SpO2 97%   BMI 27.41 kg/m   Estimated body mass index is 27.41 kg/m  as calculated from the following:    Height as of 1/3/22: 1.778 m (5' 10\").    Weight as of this encounter: 86.6 kg (191 lb).  Physical Exam  GENERAL: healthy, alert and no distress  EYES: Eyes grossly normal to inspection, PERRL and conjunctivae and sclerae normal  HENT: ear canals and TM's normal, nose and mouth without ulcers or lesions  NECK: no adenopathy, no asymmetry, masses, or scars and thyroid normal to palpation  RESP: lungs clear to auscultation - no rales, rhonchi or wheezes  CV: regular rate and rhythm, normal S1 S2, no S3 or S4, no murmur, click or rub, no peripheral edema and peripheral pulses strong  ABDOMEN: soft, nontender, no hepatosplenomegaly, no masses and bowel sounds normal   (male): normal male genitalia without lesions or urethral discharge, no hernia  MS: no gross musculoskeletal defects noted, no edema  SKIN: no suspicious lesions or rashes- 2 skin tags on neck - bother some  With his gold chain  NEURO: Normal strength and tone, mentation intact and speech normal  PSYCH: mentation " appears normal, affect normal/bright  LYMPH: no cervical, supraclavicular, axillary, or inguinal adenopathy      No results found for this or any previous visit (from the past 48 hour(s)).    Lab on 02/09/2023   Component Date Value Ref Range Status     Cholesterol 02/09/2023 194  <200 mg/dL Final     Triglycerides 02/09/2023 51  <150 mg/dL Final     Direct Measure HDL 02/09/2023 63  >=40 mg/dL Final     LDL Cholesterol Calculated 02/09/2023 121 (H)  <=100 mg/dL Final     Non HDL Cholesterol 02/09/2023 131 (H)  <130 mg/dL Final     TSH 02/09/2023 2.22  0.30 - 4.20 uIU/mL Final     PSA Tumor Marker 02/09/2023 1.87  0.00 - 4.50 ng/mL Final     Sodium 02/09/2023 137  136 - 145 mmol/L Final     Potassium 02/09/2023 4.7  3.4 - 5.3 mmol/L Final     Chloride 02/09/2023 102  98 - 107 mmol/L Final     Carbon Dioxide (CO2) 02/09/2023 24  22 - 29 mmol/L Final     Anion Gap 02/09/2023 11  7 - 15 mmol/L Final     Urea Nitrogen 02/09/2023 15.2  8.0 - 23.0 mg/dL Final     Creatinine 02/09/2023 0.95  0.67 - 1.17 mg/dL Final     Calcium 02/09/2023 9.7  8.8 - 10.2 mg/dL Final     Glucose 02/09/2023 88  70 - 99 mg/dL Final     Alkaline Phosphatase 02/09/2023 76  40 - 129 U/L Final     AST 02/09/2023 27  10 - 50 U/L Final     ALT 02/09/2023 19  10 - 50 U/L Final     Protein Total 02/09/2023 7.5  6.4 - 8.3 g/dL Final     Albumin 02/09/2023 4.5  3.5 - 5.2 g/dL Final     Bilirubin Total 02/09/2023 0.9  <=1.2 mg/dL Final     GFR Estimate 02/09/2023 88  >60 mL/min/1.73m2 Final    eGFR calculated using 2021 CKD-EPI equation.     WBC Count 02/09/2023 4.6  4.0 - 11.0 10e3/uL Final     RBC Count 02/09/2023 4.71  4.40 - 5.90 10e6/uL Final     Hemoglobin 02/09/2023 15.3  13.3 - 17.7 g/dL Final     Hematocrit 02/09/2023 46.1  40.0 - 53.0 % Final     MCV 02/09/2023 98  78 - 100 fL Final     MCH 02/09/2023 32.5  26.5 - 33.0 pg Final     MCHC 02/09/2023 33.2  31.5 - 36.5 g/dL Final     RDW 02/09/2023 12.8  10.0 - 15.0 % Final     Platelet Count  02/09/2023 204  150 - 450 10e3/uL Final     % Neutrophils 02/09/2023 66  % Final     % Lymphocytes 02/09/2023 23  % Final     % Monocytes 02/09/2023 9  % Final     % Eosinophils 02/09/2023 1  % Final     % Basophils 02/09/2023 1  % Final     % Immature Granulocytes 02/09/2023 0  % Final     NRBCs per 100 WBC 02/09/2023 0  <1 /100 Final     Absolute Neutrophils 02/09/2023 3.0  1.6 - 8.3 10e3/uL Final     Absolute Lymphocytes 02/09/2023 1.1  0.8 - 5.3 10e3/uL Final     Absolute Monocytes 02/09/2023 0.4  0.0 - 1.3 10e3/uL Final     Absolute Eosinophils 02/09/2023 0.0  0.0 - 0.7 10e3/uL Final     Absolute Basophils 02/09/2023 0.1  0.0 - 0.2 10e3/uL Final     Absolute Immature Granulocytes 02/09/2023 0.0  <=0.4 10e3/uL Final     Absolute NRBCs 02/09/2023 0.0  10e3/uL Final       ASSESSMENT / PLAN:   (R03.0) Elevated blood pressure reading without diagnosis of hypertension  (primary encounter diagnosis)  Comment: BP checked frequently at home -- in normal range   Plan: will keep watching at home. He gets nervous coming here    (F41.8) Situational anxiety  Comment: RF needed   Plan: LORazepam (ATIVAN) 1 MG tablet            (Z80.42) Family history of malignant neoplasm of prostate  Comment: psa done   Plan: psa  Ok     (R35.1) Nocturia  Comment: mild   Plan: psa ok. No meds needed     (N52.01) Erectile dysfunction due to arterial insufficiency  Comment: rf needed   Plan: sildenafil (VIAGRA) 100 MG tablet            (E78.00) hypercholesterolemia  Comment: mild elevation in ldl/non-hdl  Plan: will watch.     (Z83.71) Family history of colonic polyps  Comment: UTD on colon   Plan: Repeat in several years needed     (L30.0) Nummular eczematous dermatitis  Comment: RF needed   Plan: ketoconazole (NIZORAL) 2 % external cream,         triamcinolone (KENALOG) 0.1 % external cream            (R03.0) White coat syndrome with high blood pressure without hypertension  Comment: as above   Plan: will watch     (O12.205) Infective otitis  externa, bilateral  Comment: on and off. Needs RF   Plan: neomycin-polymyxin-hydrocortisone (CORTISPORIN)        3.5-34239-3 otic solution          Skin tag- times 2- area cleaned and with tenotomy scissor and pickup - they were removed and bandage placed.           COUNSELING:  Reviewed preventive health counseling, as reflected in patient instructions       Regular exercise       Healthy diet/nutrition       Colon cancer screening       Prostate cancer screening        He reports that he has never smoked. He has never used smokeless tobacco.      Appropriate preventive services were discussed with this patient, including applicable screening as appropriate for cardiovascular disease, diabetes, osteopenia/osteoporosis, and glaucoma.  As appropriate for age/gender, discussed screening for colorectal cancer, prostate cancer, breast cancer, and cervical cancer. Checklist reviewing preventive services available has been given to the patient.    Reviewed patients plan of care and provided an AVS. The Basic Care Plan (routine screening as documented in Health Maintenance) for Zeus meets the Care Plan requirement. This Care Plan has been established and reviewed with the Patient.      Cirilo Landeros MD  [facility] [place]    Identified Health Risks:

## 2023-12-27 ENCOUNTER — HOSPITAL ENCOUNTER (EMERGENCY)
Facility: HOSPITAL | Age: 68
Discharge: HOME | End: 2023-12-27
Payer: MEDICARE

## 2023-12-27 ENCOUNTER — TELEPHONE (OUTPATIENT)
Dept: FAMILY MEDICINE | Facility: OTHER | Age: 68
End: 2023-12-27

## 2023-12-27 VITALS
DIASTOLIC BLOOD PRESSURE: 100 MMHG | OXYGEN SATURATION: 99 % | RESPIRATION RATE: 20 BRPM | TEMPERATURE: 97.6 F | SYSTOLIC BLOOD PRESSURE: 210 MMHG | HEART RATE: 104 BPM

## 2023-12-27 DIAGNOSIS — L03.213 PRESEPTAL CELLULITIS OF RIGHT EYE: ICD-10-CM

## 2023-12-27 PROCEDURE — 99213 OFFICE O/P EST LOW 20 MIN: CPT

## 2023-12-27 PROCEDURE — G0463 HOSPITAL OUTPT CLINIC VISIT: HCPCS

## 2023-12-27 RX ORDER — ASPIRIN 81 MG/1
81 TABLET ORAL DAILY
COMMUNITY
End: 2024-02-15

## 2023-12-27 RX ORDER — OFLOXACIN 3 MG/ML
1 SOLUTION/ DROPS OPHTHALMIC 4 TIMES DAILY
Qty: 10 ML | Refills: 0 | Status: SHIPPED | OUTPATIENT
Start: 2023-12-27 | End: 2024-02-15

## 2023-12-27 RX ORDER — OFLOXACIN 3 MG/ML
1 SOLUTION/ DROPS OPHTHALMIC
Qty: 10 ML | Refills: 0 | Status: SHIPPED | OUTPATIENT
Start: 2023-12-27 | End: 2023-12-27

## 2023-12-27 ASSESSMENT — ENCOUNTER SYMPTOMS
PHOTOPHOBIA: 0
APPETITE CHANGE: 0
RHINORRHEA: 0
EYE REDNESS: 0
EYE ITCHING: 1
SHORTNESS OF BREATH: 0
COUGH: 0
ACTIVITY CHANGE: 0
CHILLS: 0
FEVER: 0
EYE DISCHARGE: 1
EYE PAIN: 0
SORE THROAT: 0

## 2023-12-27 ASSESSMENT — VISUAL ACUITY: OS: 20/40;WITHOUT CORRECTIVE LENSES

## 2023-12-27 NOTE — TELEPHONE ENCOUNTER
3:34 PM    Reason for Call: OVERBOOK    Patient is having the following symptoms: Has a swollen eyelid and is itchy for 2 days.    The patient is requesting an appointment for today with grayson.    Was an appointment offered for this call? No  If yes : Appointment type              Date    Preferred method for responding to this message: Telephone Call  What is your phone number ?  [phone]    If we cannot reach you directly, may we leave a detailed response at the number you provided? Yes    Can this message wait until your PCP/provider returns, if unavailable today? Not applicable    Shirley Rosales

## 2023-12-27 NOTE — TELEPHONE ENCOUNTER
Called and spoke with patient. Writer offered an appointment on 12/28/2023 at 730 and advised urgent care if can't make it on 12/28/2023. Patient stated he would like to be scheduled for 12/28/2023 at 730, but is also going to see what the wait is at urgent care. Patient stated he will call if he goes to urgent care. Patient has been scheduled for 12/28/2023 at 730 with PCP.

## 2023-12-28 NOTE — ED TRIAGE NOTES
"States that he does have \"white coat syndrome\" and watches his bp at home normal 130s/60s        "

## 2023-12-28 NOTE — DISCHARGE INSTRUCTIONS
Oral antibiotics 2 times daily for 7 days. Yogurt or probiotic while taking. Eye drops to right eye every 6 hours while awake. Return with any eye pain, visual changes, fevers, or other concerns. Follow up in the clinic as needed.

## 2023-12-28 NOTE — ED TRIAGE NOTES
Pt presents with c/o eye pain  Day 2   Itchy, red swollen right eye lid.     Tried warm wash clothes

## 2023-12-28 NOTE — ED PROVIDER NOTES
History     Chief Complaint   Patient presents with    Eye Problem     HPI  Zeus Ford is a 68 year old male who presents to the urgent care with a 2 day history of right upper eye lid swelling, redness, and itching. He denies eye pain, visual changes, fevers, chills, and recent illnesses. Does not wear contacts. No recent abx or OTC medications.    Allergies:  Allergies   Allergen Reactions    Bactrim [Sulfamethoxazole W-Trimethoprim] Rash    Sulfa Antibiotics Rash       Problem List:    Patient Active Problem List    Diagnosis Date Noted    Nummular eczematous dermatitis 05/09/2017     Priority: Medium    ACP (advance care planning) 02/09/2017     Priority: Medium     Advance Care Planning 2/9/2017: ACP Review of Chart / Resources Provided:  Reviewed chart for advance care plan.  Zeus Ford has no plan or code status on file. Discussed available resources and provided with information. .  Added by Red Law            Nocturia 02/08/2016     Priority: Medium    Other male erectile dysfunction 02/08/2016     Priority: Medium    Elevated prostate specific antigen (PSA) 02/11/2015     Priority: Medium    Advanced care planning/counseling discussion 02/04/2013     Priority: Medium    Elevated BP/Pulse -white coat syndrome. 02/06/2012     Priority: Medium    hypercholesterolemia 02/07/2011     Priority: Medium    Family history of malignant neoplasm of prostate 02/07/2011     Priority: Medium    Family history of colonic polyps 02/07/2011     Priority: Medium        Past Medical History:    Past Medical History:   Diagnosis Date    Elevated BP/Pulse -white coat syndrome. 2/6/2012    Family history of colonic polyps 2/7/2011    Family history of malignant neoplasm of prostate 2/7/2011    hypercholesterolemia 2/7/2011    Impaired fasting glucose 2/7/2011    Other male erectile dysfunction 2/8/2016       Past Surgical History:    Past Surgical History:   Procedure Laterality Date    COLONOSCOPY  2005     repeat 2015    COLONOSCOPY N/A 4/9/2015    Repeat in 2025//Procedure: COLONOSCOPY;  Surgeon: Shan Nice MD;  Location: [facility] OR    HERNIA REPAIR  1970    Hernia, inguinal right       Family History:    Family History   Problem Relation Age of Onset    Cancer Father         pancreatic       Social History:  Marital Status:   [2]  Social History     Tobacco Use    Smoking status: Never    Smokeless tobacco: Never   Vaping Use    Vaping Use: Never used   Substance Use Topics    Alcohol use: Yes     Comment: rarely     Drug use: No        Medications:    amoxicillin-clavulanate (AUGMENTIN) 875-125 MG tablet  aspirin 81 MG EC tablet  multivitamin (CENTRUM SILVER) tablet  ofloxacin (OCUFLOX) 0.3 % ophthalmic solution  EPINEPHrine (EPIPEN/ADRENACLICK/OR ANY BX GENERIC EQUIV) 0.3 MG/0.3ML injection 2-pack  ketoconazole (NIZORAL) 2 % external cream  LORazepam (ATIVAN) 1 MG tablet  neomycin-polymyxin-hydrocortisone (CORTISPORIN) 3.5-95164-3 otic solution  sildenafil (VIAGRA) 100 MG tablet  terbinafine (LAMISIL AT) 1 % external cream  triamcinolone (KENALOG) 0.1 % external cream          Review of Systems   Constitutional:  Negative for activity change, appetite change, chills and fever.   HENT:  Negative for congestion, rhinorrhea and sore throat.    Eyes:  Positive for discharge and itching. Negative for photophobia, pain, redness and visual disturbance.   Respiratory:  Negative for cough and shortness of breath.    All other systems reviewed and are negative.      Physical Exam   BP: (!) 210/100  Pulse: 104  Temp: 97.6  F (36.4  C)  Resp: 20  SpO2: 99 %      Physical Exam  Vitals and nursing note reviewed.   Constitutional:       General: He is not in acute distress.     Appearance: Normal appearance. He is normal weight. He is not ill-appearing or toxic-appearing.   HENT:      Right Ear: Tympanic membrane, ear canal and external ear normal. There is no impacted cerumen.      Left Ear: Tympanic membrane, ear  canal and external ear normal. There is no impacted cerumen.      Nose: No congestion or rhinorrhea.      Mouth/Throat:      Mouth: Mucous membranes are moist.      Pharynx: Oropharynx is clear. No oropharyngeal exudate or posterior oropharyngeal erythema.   Eyes:      General: No scleral icterus.        Right eye: Discharge present.         Left eye: No discharge.      Extraocular Movements: Extraocular movements intact.      Right eye: Normal extraocular motion and no nystagmus.      Left eye: Normal extraocular motion and no nystagmus.      Conjunctiva/sclera:      Right eye: Right conjunctiva is not injected. Exudate present. No chemosis or hemorrhage.     Left eye: Left conjunctiva is not injected. No chemosis, exudate or hemorrhage.     Pupils: Pupils are equal, round, and reactive to light. Pupils are equal.      Right eye: Pupil is round, reactive and not sluggish.      Left eye: Pupil is round, reactive and not sluggish.     Cardiovascular:      Rate and Rhythm: Normal rate and regular rhythm.      Pulses: Normal pulses.      Heart sounds: Normal heart sounds.   Pulmonary:      Effort: Pulmonary effort is normal. No respiratory distress.      Breath sounds: Normal breath sounds. No stridor. No wheezing, rhonchi or rales.   Neurological:      Mental Status: He is alert.         ED Course                 Procedures                No results found for this or any previous visit (from the past 24 hour(s)).    Medications - No data to display    Assessments & Plan (with Medical Decision Making)     I have reviewed the nursing notes.    I have reviewed the findings, diagnosis, plan and need for follow up with the patient.  Zeus Ford is a 68 year old male who presents to the urgent care with a 2 day history of right upper eye lid swelling, redness, and itching. He denies eye pain, visual changes, fevers, chills, and recent illnesses. Does not wear contacts. No recent abx or OTC medications.    MDM: BP  elevated, has a history of white coat syndrome. Denies any chest pain, dizziness, and shortness of breath. He checks BP daily at home and states it was in the 140s today. Vital signs normal, afebrile. Visual acuity 20/40 bilaterally. There is no pain with eye movement. PERRL 2-3mm. No conjunctival injection. Scant purulent drainage with redness and warmth to right upper eyelid. Concern for possible preseptal cellulitis. Will treat with augmentin, per Up to Date recommendation. Ofloxacin drops also prescribed. Supportive measures and return precautions discussed. He is in agreement with plan.     (L03.213) Preseptal cellulitis of right eye  Plan: Oral antibiotics 2 times daily for 7 days. Yogurt or probiotic while taking. Eye drops to right eye every 6 hours while awake. Return with any eye pain, visual changes, fevers, or other concerns. Follow up in the clinic as needed. Understanding verbalized.        New Prescriptions    AMOXICILLIN-CLAVULANATE (AUGMENTIN) 875-125 MG TABLET    Take 1 tablet by mouth 2 times daily for 7 days    OFLOXACIN (OCUFLOX) 0.3 % OPHTHALMIC SOLUTION    Place 1 drop into the right eye 4 times daily       Final diagnoses:   Preseptal cellulitis of right eye       12/27/2023   [facility]       Ayana Mendez NP  12/27/23 2048

## 2024-02-07 ENCOUNTER — TELEPHONE (OUTPATIENT)
Dept: FAMILY MEDICINE | Facility: OTHER | Age: 69
End: 2024-02-07

## 2024-02-07 DIAGNOSIS — Z00.00 ROUTINE GENERAL MEDICAL EXAMINATION AT A HEALTH CARE FACILITY: ICD-10-CM

## 2024-02-07 DIAGNOSIS — Z12.5 SCREENING FOR PROSTATE CANCER: ICD-10-CM

## 2024-02-07 DIAGNOSIS — R03.0 ELEVATED BLOOD PRESSURE READING WITHOUT DIAGNOSIS OF HYPERTENSION: ICD-10-CM

## 2024-02-07 DIAGNOSIS — E78.00 PURE HYPERCHOLESTEROLEMIA: Primary | ICD-10-CM

## 2024-02-07 NOTE — TELEPHONE ENCOUNTER
Patient requesting labs be done on 2/14 for his 2/15 appt.   Labs pended for approval.   Thank you.

## 2024-02-07 NOTE — TELEPHONE ENCOUNTER
10:11 AM    Reason for Call: Phone Call    Description: Patient has an appointment on 02/15/24, Patient would like to come in on the 14th for labs around 10:00. Please call patient to let him know if he can come in the day before.    Was an appointment offered for this call? No  If yes : Appointment type              Date    Preferred method for responding to this message: Telephone Call  What is your phone number ?  [phone]    If we cannot reach you directly, may we leave a detailed response at the number you provided? Yes    Can this message wait until your PCP/provider returns, if available today? YES, Provider is in today    Sheela Prince

## 2024-02-14 ENCOUNTER — LAB (OUTPATIENT)
Dept: LAB | Facility: OTHER | Age: 69
End: 2024-02-14
Payer: MEDICARE

## 2024-02-14 DIAGNOSIS — Z12.5 SCREENING FOR PROSTATE CANCER: ICD-10-CM

## 2024-02-14 DIAGNOSIS — Z00.00 ROUTINE GENERAL MEDICAL EXAMINATION AT A HEALTH CARE FACILITY: ICD-10-CM

## 2024-02-14 DIAGNOSIS — E78.00 PURE HYPERCHOLESTEROLEMIA: ICD-10-CM

## 2024-02-14 DIAGNOSIS — R03.0 ELEVATED BLOOD PRESSURE READING WITHOUT DIAGNOSIS OF HYPERTENSION: ICD-10-CM

## 2024-02-14 LAB
ALBUMIN SERPL BCG-MCNC: 4.6 G/DL (ref 3.5–5.2)
ALP SERPL-CCNC: 69 U/L (ref 40–150)
ALT SERPL W P-5'-P-CCNC: 19 U/L (ref 0–70)
ANION GAP SERPL CALCULATED.3IONS-SCNC: 11 MMOL/L (ref 7–15)
AST SERPL W P-5'-P-CCNC: 20 U/L (ref 0–45)
BASOPHILS # BLD AUTO: 0.1 10E3/UL (ref 0–0.2)
BASOPHILS NFR BLD AUTO: 1 %
BILIRUB SERPL-MCNC: 1 MG/DL
BUN SERPL-MCNC: 15.6 MG/DL (ref 8–23)
CALCIUM SERPL-MCNC: 9.6 MG/DL (ref 8.8–10.2)
CHLORIDE SERPL-SCNC: 101 MMOL/L (ref 98–107)
CHOLEST SERPL-MCNC: 182 MG/DL
CREAT SERPL-MCNC: 0.9 MG/DL (ref 0.67–1.17)
DEPRECATED HCO3 PLAS-SCNC: 26 MMOL/L (ref 22–29)
EGFRCR SERPLBLD CKD-EPI 2021: >90 ML/MIN/1.73M2
EOSINOPHIL # BLD AUTO: 0 10E3/UL (ref 0–0.7)
EOSINOPHIL NFR BLD AUTO: 1 %
ERYTHROCYTE [DISTWIDTH] IN BLOOD BY AUTOMATED COUNT: 13.1 % (ref 10–15)
FASTING STATUS PATIENT QL REPORTED: YES
GLUCOSE SERPL-MCNC: 100 MG/DL (ref 70–99)
HCT VFR BLD AUTO: 44.5 % (ref 40–53)
HDLC SERPL-MCNC: 57 MG/DL
HGB BLD-MCNC: 14.9 G/DL (ref 13.3–17.7)
IMM GRANULOCYTES # BLD: 0 10E3/UL
IMM GRANULOCYTES NFR BLD: 0 %
LDLC SERPL CALC-MCNC: 113 MG/DL
LYMPHOCYTES # BLD AUTO: 1 10E3/UL (ref 0.8–5.3)
LYMPHOCYTES NFR BLD AUTO: 19 %
MCH RBC QN AUTO: 32.4 PG (ref 26.5–33)
MCHC RBC AUTO-ENTMCNC: 33.5 G/DL (ref 31.5–36.5)
MCV RBC AUTO: 97 FL (ref 78–100)
MONOCYTES # BLD AUTO: 0.6 10E3/UL (ref 0–1.3)
MONOCYTES NFR BLD AUTO: 11 %
NEUTROPHILS # BLD AUTO: 3.5 10E3/UL (ref 1.6–8.3)
NEUTROPHILS NFR BLD AUTO: 68 %
NONHDLC SERPL-MCNC: 125 MG/DL
NRBC # BLD AUTO: 0 10E3/UL
NRBC BLD AUTO-RTO: 0 /100
PLATELET # BLD AUTO: 198 10E3/UL (ref 150–450)
POTASSIUM SERPL-SCNC: 4.5 MMOL/L (ref 3.4–5.3)
PROT SERPL-MCNC: 7.4 G/DL (ref 6.4–8.3)
PSA SERPL DL<=0.01 NG/ML-MCNC: 2.11 NG/ML (ref 0–4.5)
RBC # BLD AUTO: 4.6 10E6/UL (ref 4.4–5.9)
SODIUM SERPL-SCNC: 138 MMOL/L (ref 135–145)
TRIGL SERPL-MCNC: 59 MG/DL
TSH SERPL DL<=0.005 MIU/L-ACNC: 2.6 UIU/ML (ref 0.3–4.2)
WBC # BLD AUTO: 5.1 10E3/UL (ref 4–11)

## 2024-02-14 PROCEDURE — 80061 LIPID PANEL: CPT | Mod: ZL

## 2024-02-14 PROCEDURE — G0103 PSA SCREENING: HCPCS | Mod: ZL

## 2024-02-14 PROCEDURE — 85025 COMPLETE CBC W/AUTO DIFF WBC: CPT | Mod: ZL

## 2024-02-14 PROCEDURE — 84443 ASSAY THYROID STIM HORMONE: CPT | Mod: ZL

## 2024-02-14 PROCEDURE — 36415 COLL VENOUS BLD VENIPUNCTURE: CPT | Mod: ZL

## 2024-02-14 PROCEDURE — 80053 COMPREHEN METABOLIC PANEL: CPT | Mod: ZL

## 2024-02-15 ENCOUNTER — OFFICE VISIT (OUTPATIENT)
Dept: FAMILY MEDICINE | Facility: OTHER | Age: 69
End: 2024-02-15
Attending: FAMILY MEDICINE
Payer: MEDICARE

## 2024-02-15 VITALS
HEART RATE: 124 BPM | TEMPERATURE: 98 F | SYSTOLIC BLOOD PRESSURE: 130 MMHG | DIASTOLIC BLOOD PRESSURE: 88 MMHG | OXYGEN SATURATION: 98 % | BODY MASS INDEX: 27.41 KG/M2 | WEIGHT: 191 LBS

## 2024-02-15 DIAGNOSIS — N52.01 ERECTILE DYSFUNCTION DUE TO ARTERIAL INSUFFICIENCY: ICD-10-CM

## 2024-02-15 DIAGNOSIS — Z71.85 IMMUNIZATION COUNSELING: ICD-10-CM

## 2024-02-15 DIAGNOSIS — F41.8 SITUATIONAL ANXIETY: ICD-10-CM

## 2024-02-15 DIAGNOSIS — Z83.719 FAMILY HISTORY OF COLONIC POLYPS: ICD-10-CM

## 2024-02-15 DIAGNOSIS — H01.025 SQUAMOUS BLEPHARITIS OF LOWER EYELIDS OF BOTH EYES: ICD-10-CM

## 2024-02-15 DIAGNOSIS — L30.0 NUMMULAR ECZEMATOUS DERMATITIS: ICD-10-CM

## 2024-02-15 DIAGNOSIS — E78.00 PURE HYPERCHOLESTEROLEMIA: Primary | ICD-10-CM

## 2024-02-15 DIAGNOSIS — Z80.42 FAMILY HISTORY OF MALIGNANT NEOPLASM OF PROSTATE: ICD-10-CM

## 2024-02-15 DIAGNOSIS — H01.022 SQUAMOUS BLEPHARITIS OF LOWER EYELIDS OF BOTH EYES: ICD-10-CM

## 2024-02-15 DIAGNOSIS — H61.22 IMPACTED CERUMEN OF LEFT EAR: ICD-10-CM

## 2024-02-15 DIAGNOSIS — R03.0 ELEVATED BLOOD PRESSURE READING WITHOUT DIAGNOSIS OF HYPERTENSION: ICD-10-CM

## 2024-02-15 DIAGNOSIS — H60.393 INFECTIVE OTITIS EXTERNA, BILATERAL: ICD-10-CM

## 2024-02-15 DIAGNOSIS — R35.1 NOCTURIA: ICD-10-CM

## 2024-02-15 DIAGNOSIS — R05.3 CHRONIC COUGH: ICD-10-CM

## 2024-02-15 PROCEDURE — 99214 OFFICE O/P EST MOD 30 MIN: CPT | Performed by: FAMILY MEDICINE

## 2024-02-15 PROCEDURE — G0463 HOSPITAL OUTPT CLINIC VISIT: HCPCS | Mod: 25

## 2024-02-15 PROCEDURE — G0009 ADMIN PNEUMOCOCCAL VACCINE: HCPCS

## 2024-02-15 PROCEDURE — 69209 REMOVE IMPACTED EAR WAX UNI: CPT | Performed by: FAMILY MEDICINE

## 2024-02-15 RX ORDER — TRIAMCINOLONE ACETONIDE 1 MG/G
CREAM TOPICAL
Qty: 30 G | Refills: 3 | Status: SHIPPED | OUTPATIENT
Start: 2024-02-15

## 2024-02-15 RX ORDER — LORAZEPAM 1 MG/1
1 TABLET ORAL EVERY 8 HOURS PRN
Qty: 20 TABLET | Refills: 0 | Status: SHIPPED | OUTPATIENT
Start: 2024-02-15

## 2024-02-15 RX ORDER — CODEINE PHOSPHATE AND GUAIFENESIN 10; 100 MG/5ML; MG/5ML
1-2 SOLUTION ORAL EVERY 4 HOURS PRN
Qty: 240 ML | Refills: 0 | Status: SHIPPED | OUTPATIENT
Start: 2024-02-15

## 2024-02-15 RX ORDER — ERYTHROMYCIN 5 MG/G
0.5 OINTMENT OPHTHALMIC 3 TIMES DAILY
Qty: 3.5 G | Refills: 1 | Status: SHIPPED | OUTPATIENT
Start: 2024-02-15

## 2024-02-15 RX ORDER — SILDENAFIL 100 MG/1
100 TABLET, FILM COATED ORAL DAILY PRN
Qty: 6 TABLET | Refills: 11 | Status: SHIPPED | OUTPATIENT
Start: 2024-02-15

## 2024-02-15 RX ORDER — NEOMYCIN SULFATE, POLYMYXIN B SULFATE, HYDROCORTISONE 3.5; 10000; 1 MG/ML; [USP'U]/ML; MG/ML
3 SOLUTION/ DROPS AURICULAR (OTIC) 4 TIMES DAILY
Qty: 10 ML | Refills: 3 | Status: SHIPPED | OUTPATIENT
Start: 2024-02-15

## 2024-02-15 RX ORDER — LORAZEPAM 1 MG/1
1 TABLET ORAL EVERY 8 HOURS PRN
Qty: 20 TABLET | Refills: 0 | Status: SHIPPED | OUTPATIENT
Start: 2024-02-15 | End: 2024-02-15

## 2024-02-15 SDOH — HEALTH STABILITY: PHYSICAL HEALTH: ON AVERAGE, HOW MANY MINUTES DO YOU ENGAGE IN EXERCISE AT THIS LEVEL?: 40 MIN

## 2024-02-15 SDOH — HEALTH STABILITY: PHYSICAL HEALTH: ON AVERAGE, HOW MANY DAYS PER WEEK DO YOU ENGAGE IN MODERATE TO STRENUOUS EXERCISE (LIKE A BRISK WALK)?: 5 DAYS

## 2024-02-15 ASSESSMENT — ANXIETY QUESTIONNAIRES
GAD7 TOTAL SCORE: 2
7. FEELING AFRAID AS IF SOMETHING AWFUL MIGHT HAPPEN: NOT AT ALL
7. FEELING AFRAID AS IF SOMETHING AWFUL MIGHT HAPPEN: NOT AT ALL
4. TROUBLE RELAXING: NOT AT ALL
8. IF YOU CHECKED OFF ANY PROBLEMS, HOW DIFFICULT HAVE THESE MADE IT FOR YOU TO DO YOUR WORK, TAKE CARE OF THINGS AT HOME, OR GET ALONG WITH OTHER PEOPLE?: NOT DIFFICULT AT ALL
GAD7 TOTAL SCORE: 2
GAD7 TOTAL SCORE: 2
1. FEELING NERVOUS, ANXIOUS, OR ON EDGE: SEVERAL DAYS
2. NOT BEING ABLE TO STOP OR CONTROL WORRYING: NOT AT ALL
3. WORRYING TOO MUCH ABOUT DIFFERENT THINGS: SEVERAL DAYS
5. BEING SO RESTLESS THAT IT IS HARD TO SIT STILL: NOT AT ALL
IF YOU CHECKED OFF ANY PROBLEMS ON THIS QUESTIONNAIRE, HOW DIFFICULT HAVE THESE PROBLEMS MADE IT FOR YOU TO DO YOUR WORK, TAKE CARE OF THINGS AT HOME, OR GET ALONG WITH OTHER PEOPLE: NOT DIFFICULT AT ALL
6. BECOMING EASILY ANNOYED OR IRRITABLE: NOT AT ALL

## 2024-02-15 ASSESSMENT — SOCIAL DETERMINANTS OF HEALTH (SDOH)
HOW OFTEN DO YOU GET TOGETHER WITH FRIENDS OR RELATIVES?: TWICE A WEEK
HOW OFTEN DO YOU GET TOGETHER WITH FRIENDS OR RELATIVES?: TWICE A WEEK

## 2024-02-15 ASSESSMENT — PAIN SCALES - GENERAL: PAINLEVEL: NO PAIN (0)

## 2024-02-15 ASSESSMENT — PATIENT HEALTH QUESTIONNAIRE - PHQ9
10. IF YOU CHECKED OFF ANY PROBLEMS, HOW DIFFICULT HAVE THESE PROBLEMS MADE IT FOR YOU TO DO YOUR WORK, TAKE CARE OF THINGS AT HOME, OR GET ALONG WITH OTHER PEOPLE: NOT DIFFICULT AT ALL
SUM OF ALL RESPONSES TO PHQ QUESTIONS 1-9: 0
SUM OF ALL RESPONSES TO PHQ QUESTIONS 1-9: 0

## 2024-02-15 NOTE — PROGRESS NOTES
Assessment & Plan     Pure hypercholesterolemia  Stable - well controlled with diet     Elevated blood pressure reading without diagnosis of hypertension  VERY much WHITE COAT HTN    Situational anxiety  RF done   - LORazepam (ATIVAN) 1 MG tablet; Take 1 tablet (1 mg) by mouth every 8 hours as needed for anxiety    Family history of malignant neoplasm of prostate  Psa ok     Nocturia  Mild - ok     Family history of colonic polyps  UTD on colonoscopy     Immunization counseling  Discussed. Pneumovax given and pt to get others at pharm.    Erectile dysfunction due to arterial insufficiency  Stable RF needed   - sildenafil (VIAGRA) 100 MG tablet; Take 1 tablet (100 mg) by mouth daily as needed Take 30 min to 4 hours before intercourse.  Never use with nitroglycerin, terazosin or doxazosin.    Nummular eczematous dermatitis  Stable RF needed   - triamcinolone (KENALOG) 0.1 % external cream; Apply sparingly to affected area three times daily for 14 days.    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  Uses prn RF given  - erythromycin (ROMYCIN) 5 MG/GM ophthalmic ointment; Place 0.5 inches into both eyes 3 times daily    Chronic cough  RF given uses very rarely  - guaiFENesin-codeine (ROBITUSSIN AC) 100-10 MG/5ML solution; Take 5-10 mLs by mouth every 4 hours as needed for cough    Impacted cerumen of left ear  Ear wash by nurse with good results   - REMOVE IMPACTED CERUMEN            Counseling  Appropriate preventive services were discussed with this patient, including applicable screening as appropriate for fall prevention, nutrition, physical activity, Tobacco-use cessation, weight loss and cognition.  Checklist reviewing preventive services available has been given to the patient.  Reviewed patient's diet, addressing concerns and/or  questions.   Patient reported safety concerns were addressed today.          No follow-ups on file.    Trina Schulz is a 68 year old, presenting for the following health issues:  Annual Visit and Anxiety        2/15/2024     8:42 AM   Additional Questions   Roomed by Carlotta Newman   Accompanied by None         2/15/2024     8:42 AM   Patient Reported Additional Medications   Patient reports taking the following new medications None     HPI     Patient previously had a prescription for Erythromycin eye ointment wanting a refill on this medication, as well as Codeine cough syrup     Hypertension Follow-up    Do you check your blood pressure regularly outside of the clinic? Yes   Are you following a low salt diet? Yes  Are your blood pressures ever more than 140 on the top number (systolic) OR more   than 90 on the bottom number (diastolic), for example 140/90? No    Has lots of white coat htn and tachy  Check BP and pulse at home - always in goal      Anxiety Follow-Up  How are you doing with your anxiety since your last visit? No change  Are you having other symptoms that might be associated with anxiety? No  Have you had a significant life event? No   Are you feeling depressed? No  Do you have any concerns with your use of alcohol or other drugs? No    Social History     Tobacco Use    Smoking status: Never    Smokeless tobacco: Never   Vaping Use    Vaping Use: Never used   Substance Use Topics    Alcohol use: Yes     Comment: rarely     Drug use: No         2/11/2019     8:00 AM 2/8/2021     8:00 AM 2/15/2024     8:38 AM   BARRIE-7 SCORE   Total Score   2 (minimal anxiety)   Total Score 0 2 2         2/10/2020     8:00 AM 2/8/2021     8:00 AM 2/15/2024     8:37 AM   PHQ   PHQ-9 Total Score 0 0 0   Q9: Thoughts of better off dead/self-harm past 2 weeks Not at all Not at all Not at all         2/15/2024     8:37 AM   Last PHQ-9   1.  Little interest or pleasure in doing things 0   2.  Feeling down,  depressed, or hopeless 0   3.  Trouble falling or staying asleep, or sleeping too much 0   4.  Feeling tired or having little energy 0   5.  Poor appetite or overeating 0   6.  Feeling bad about yourself 0   7.  Trouble concentrating 0   8.  Moving slowly or restless 0   Q9: Thoughts of better off dead/self-harm past 2 weeks 0   PHQ-9 Total Score 0         2/15/2024     8:38 AM   BARRIE-7    1. Feeling nervous, anxious, or on edge 1   2. Not being able to stop or control worrying 0   3. Worrying too much about different things 1   4. Trouble relaxing 0   5. Being so restless that it is hard to sit still 0   6. Becoming easily annoyed or irritable 0   7. Feeling afraid, as if something awful might happen 0   BARRIE-7 Total Score 2   If you checked any problems, how difficult have they made it for you to do your work, take care of things at home, or get along with other people? Not difficult at all             Review of Systems  Constitutional, neuro, ENT, endocrine, pulmonary, cardiac, gastrointestinal, genitourinary, musculoskeletal, integument and psychiatric systems are negative, except as otherwise noted.      Objective    /88   Pulse (!) 124   Temp 98  F (36.7  C) (Tympanic)   Wt 86.6 kg (191 lb)   SpO2 98%   BMI 27.41 kg/m    Body mass index is 27.41 kg/m .  Physical Exam   GENERAL: alert and no distress  EYES: Eyes grossly normal to inspection, PERRL and conjunctivae and sclerae normal  HENT: ear canals and TM's normal, nose and mouth without ulcers or lesions  NECK: no adenopathy, no asymmetry, masses, or scars  RESP: lungs clear to auscultation - no rales, rhonchi or wheezes  CV: regular rate and rhythm, normal S1 S2, no S3 or S4, no murmur, click or rub, no peripheral edema  ABDOMEN: soft, nontender, no hepatosplenomegaly, no masses and bowel sounds normal   (male): normal male genitalia without lesions or urethral discharge, no hernia  MS: no gross musculoskeletal defects noted, no edema  SKIN: no  suspicious lesions or rashes  NEURO: Normal strength and tone, mentation intact and speech normal  PSYCH: mentation appears normal, affect normal/bright  LYMPH: no cervical, supraclavicular, axillary, or inguinal adenopathy    No results found for this or any previous visit (from the past 48 hour(s)).    Lab on 02/14/2024   Component Date Value Ref Range Status    Cholesterol 02/14/2024 182  <200 mg/dL Final    Triglycerides 02/14/2024 59  <150 mg/dL Final    Direct Measure HDL 02/14/2024 57  >=40 mg/dL Final    LDL Cholesterol Calculated 02/14/2024 113 (H)  <=100 mg/dL Final    Non HDL Cholesterol 02/14/2024 125  <130 mg/dL Final    Patient Fasting > 8hrs? 02/14/2024 Yes   Final    Sodium 02/14/2024 138  135 - 145 mmol/L Final    Reference intervals for this test were updated on 09/26/2023 to more accurately reflect our healthy population. There may be differences in the flagging of prior results with similar values performed with this method. Interpretation of those prior results can be made in the context of the updated reference intervals.     Potassium 02/14/2024 4.5  3.4 - 5.3 mmol/L Final    Carbon Dioxide (CO2) 02/14/2024 26  22 - 29 mmol/L Final    Anion Gap 02/14/2024 11  7 - 15 mmol/L Final    Urea Nitrogen 02/14/2024 15.6  8.0 - 23.0 mg/dL Final    Creatinine 02/14/2024 0.90  0.67 - 1.17 mg/dL Final    GFR Estimate 02/14/2024 >90  >60 mL/min/1.73m2 Final    Calcium 02/14/2024 9.6  8.8 - 10.2 mg/dL Final    Chloride 02/14/2024 101  98 - 107 mmol/L Final    Glucose 02/14/2024 100 (H)  70 - 99 mg/dL Final    Alkaline Phosphatase 02/14/2024 69  40 - 150 U/L Final    Reference intervals for this test were updated on 11/14/2023 to more accurately reflect our healthy population. There may be differences in the flagging of prior results with similar values performed with this method. Interpretation of those prior results can be made in the context of the updated reference intervals.    AST 02/14/2024 20  0 - 45  U/L Final    Reference intervals for this test were updated on 6/12/2023 to more accurately reflect our healthy population. There may be differences in the flagging of prior results with similar values performed with this method. Interpretation of those prior results can be made in the context of the updated reference intervals.    ALT 02/14/2024 19  0 - 70 U/L Final    Reference intervals for this test were updated on 6/12/2023 to more accurately reflect our healthy population. There may be differences in the flagging of prior results with similar values performed with this method. Interpretation of those prior results can be made in the context of the updated reference intervals.      Protein Total 02/14/2024 7.4  6.4 - 8.3 g/dL Final    Albumin 02/14/2024 4.6  3.5 - 5.2 g/dL Final    Bilirubin Total 02/14/2024 1.0  <=1.2 mg/dL Final    TSH 02/14/2024 2.60  0.30 - 4.20 uIU/mL Final    Prostate Specific Antigen Screen 02/14/2024 2.11  0.00 - 4.50 ng/mL Final    WBC Count 02/14/2024 5.1  4.0 - 11.0 10e3/uL Final    RBC Count 02/14/2024 4.60  4.40 - 5.90 10e6/uL Final    Hemoglobin 02/14/2024 14.9  13.3 - 17.7 g/dL Final    Hematocrit 02/14/2024 44.5  40.0 - 53.0 % Final    MCV 02/14/2024 97  78 - 100 fL Final    MCH 02/14/2024 32.4  26.5 - 33.0 pg Final    MCHC 02/14/2024 33.5  31.5 - 36.5 g/dL Final    RDW 02/14/2024 13.1  10.0 - 15.0 % Final    Platelet Count 02/14/2024 198  150 - 450 10e3/uL Final    % Neutrophils 02/14/2024 68  % Final    % Lymphocytes 02/14/2024 19  % Final    % Monocytes 02/14/2024 11  % Final    % Eosinophils 02/14/2024 1  % Final    % Basophils 02/14/2024 1  % Final    % Immature Granulocytes 02/14/2024 0  % Final    NRBCs per 100 WBC 02/14/2024 0  <1 /100 Final    Absolute Neutrophils 02/14/2024 3.5  1.6 - 8.3 10e3/uL Final    Absolute Lymphocytes 02/14/2024 1.0  0.8 - 5.3 10e3/uL Final    Absolute Monocytes 02/14/2024 0.6  0.0 - 1.3 10e3/uL Final    Absolute Eosinophils 02/14/2024 0.0  0.0  - 0.7 10e3/uL Final    Absolute Basophils 02/14/2024 0.1  0.0 - 0.2 10e3/uL Final    Absolute Immature Granulocytes 02/14/2024 0.0  <=0.4 10e3/uL Final    Absolute NRBCs 02/14/2024 0.0  10e3/uL Final               Signed Electronically by: Cirilo Landeros MD    Answers submitted by the patient for this visit:  Patient Health Questionnaire (Submitted on 2/15/2024)  If you checked off any problems, how difficult have these problems made it for you to do your work, take care of things at home, or get along with other people?: Not difficult at all  PHQ9 TOTAL SCORE: 0  BARRIE-7 (Submitted on 2/15/2024)  BARREI 7 TOTAL SCORE: 2

## 2024-02-18 ENCOUNTER — HEALTH MAINTENANCE LETTER (OUTPATIENT)
Age: 69
End: 2024-02-18

## 2024-02-19 RX ORDER — LORAZEPAM 1 MG/1
TABLET ORAL
Qty: 20 TABLET | Refills: 0 | OUTPATIENT
Start: 2024-02-19

## 2024-05-09 ENCOUNTER — TELEPHONE (OUTPATIENT)
Dept: FAMILY MEDICINE | Facility: OTHER | Age: 69
End: 2024-05-09

## 2024-05-09 NOTE — TELEPHONE ENCOUNTER
Attempt # 1  Outcome: Not Available   Comment: Unable to leave voice mail for patient to call back to schedule annual medicare wellness exam per quality measures.

## 2025-03-03 ENCOUNTER — TELEPHONE (OUTPATIENT)
Dept: FAMILY MEDICINE | Facility: OTHER | Age: 70
End: 2025-03-03

## 2025-03-03 SDOH — HEALTH STABILITY: PHYSICAL HEALTH: ON AVERAGE, HOW MANY MINUTES DO YOU ENGAGE IN EXERCISE AT THIS LEVEL?: 30 MIN

## 2025-03-03 SDOH — HEALTH STABILITY: PHYSICAL HEALTH: ON AVERAGE, HOW MANY DAYS PER WEEK DO YOU ENGAGE IN MODERATE TO STRENUOUS EXERCISE (LIKE A BRISK WALK)?: 5 DAYS

## 2025-03-03 ASSESSMENT — SOCIAL DETERMINANTS OF HEALTH (SDOH): HOW OFTEN DO YOU GET TOGETHER WITH FRIENDS OR RELATIVES?: ONCE A WEEK

## 2025-03-03 NOTE — TELEPHONE ENCOUNTER
Patient requesting to come do fasting labs tomorrow morning before his appt. Pended if wish.  Tiki Mccauley LPN

## 2025-03-03 NOTE — TELEPHONE ENCOUNTER
1:05 PM    Reason for Call: Phone Call / Time Sensitive- Appointment tomorrow.    Description: Patient called, States that he is scheduled for his Annual Physical tomorrow, 3/4/25. States that in the past he has all ways done fasting labs with these appointments. Not seeing an Active Request on file.  Please call patient back. If he does need to do Fasting Labs, he would like to come in the morning tomorrow for Fasting Labs.    Was an appointment offered for this call? No  If yes : Appointment type              Date    Preferred method for responding to this message: Telephone Call  What is your phone number ? [phone]    If we cannot reach you directly, may we leave a detailed response at the number you provided? Yes    Can this message wait until your PCP/provider returns, if available today? Grace Buchanan

## 2025-03-04 ENCOUNTER — TELEPHONE (OUTPATIENT)
Dept: FAMILY MEDICINE | Facility: OTHER | Age: 70
End: 2025-03-04

## 2025-03-04 ENCOUNTER — OFFICE VISIT (OUTPATIENT)
Dept: FAMILY MEDICINE | Facility: OTHER | Age: 70
End: 2025-03-04
Attending: FAMILY MEDICINE
Payer: COMMERCIAL

## 2025-03-04 VITALS
BODY MASS INDEX: 27.01 KG/M2 | WEIGHT: 188.7 LBS | OXYGEN SATURATION: 98 % | TEMPERATURE: 97.4 F | HEART RATE: 110 BPM | SYSTOLIC BLOOD PRESSURE: 162 MMHG | DIASTOLIC BLOOD PRESSURE: 102 MMHG | RESPIRATION RATE: 17 BRPM | HEIGHT: 70 IN

## 2025-03-04 DIAGNOSIS — Z11.59 NEED FOR HEPATITIS C SCREENING TEST: ICD-10-CM

## 2025-03-04 DIAGNOSIS — Z13.220 ENCOUNTER FOR LIPID SCREENING FOR CARDIOVASCULAR DISEASE: ICD-10-CM

## 2025-03-04 DIAGNOSIS — Z71.85 VACCINE COUNSELING: ICD-10-CM

## 2025-03-04 DIAGNOSIS — Z12.11 ENCOUNTER FOR SCREENING COLONOSCOPY: ICD-10-CM

## 2025-03-04 DIAGNOSIS — L30.0 NUMMULAR ECZEMATOUS DERMATITIS: ICD-10-CM

## 2025-03-04 DIAGNOSIS — F41.8 SITUATIONAL ANXIETY: ICD-10-CM

## 2025-03-04 DIAGNOSIS — Z12.5 SCREENING PSA (PROSTATE SPECIFIC ANTIGEN): ICD-10-CM

## 2025-03-04 DIAGNOSIS — Z13.1 SCREENING FOR DIABETES MELLITUS: ICD-10-CM

## 2025-03-04 DIAGNOSIS — Z00.00 ENCOUNTER FOR MEDICARE ANNUAL WELLNESS EXAM: Primary | ICD-10-CM

## 2025-03-04 DIAGNOSIS — N52.01 ERECTILE DYSFUNCTION DUE TO ARTERIAL INSUFFICIENCY: ICD-10-CM

## 2025-03-04 DIAGNOSIS — Z13.6 ENCOUNTER FOR LIPID SCREENING FOR CARDIOVASCULAR DISEASE: ICD-10-CM

## 2025-03-04 DIAGNOSIS — R03.0 ELEVATED BLOOD PRESSURE READING WITHOUT DIAGNOSIS OF HYPERTENSION: ICD-10-CM

## 2025-03-04 LAB
ALBUMIN SERPL BCG-MCNC: 4.8 G/DL (ref 3.5–5.2)
ALP SERPL-CCNC: 76 U/L (ref 40–150)
ALT SERPL W P-5'-P-CCNC: 21 U/L (ref 0–70)
ANION GAP SERPL CALCULATED.3IONS-SCNC: 12 MMOL/L (ref 7–15)
AST SERPL W P-5'-P-CCNC: 28 U/L (ref 0–45)
BASOPHILS # BLD AUTO: 0 10E3/UL (ref 0–0.2)
BASOPHILS NFR BLD AUTO: 1 %
BILIRUB SERPL-MCNC: 0.9 MG/DL
BUN SERPL-MCNC: 10.6 MG/DL (ref 8–23)
CALCIUM SERPL-MCNC: 9.6 MG/DL (ref 8.8–10.4)
CHLORIDE SERPL-SCNC: 103 MMOL/L (ref 98–107)
CHOLEST SERPL-MCNC: 212 MG/DL
CREAT SERPL-MCNC: 0.78 MG/DL (ref 0.67–1.17)
EGFRCR SERPLBLD CKD-EPI 2021: >90 ML/MIN/1.73M2
EOSINOPHIL # BLD AUTO: 0.2 10E3/UL (ref 0–0.7)
EOSINOPHIL NFR BLD AUTO: 4 %
ERYTHROCYTE [DISTWIDTH] IN BLOOD BY AUTOMATED COUNT: 12.8 % (ref 10–15)
FASTING STATUS PATIENT QL REPORTED: YES
FASTING STATUS PATIENT QL REPORTED: YES
GLUCOSE SERPL-MCNC: 123 MG/DL (ref 70–99)
HCO3 SERPL-SCNC: 24 MMOL/L (ref 22–29)
HCT VFR BLD AUTO: 46.2 % (ref 40–53)
HDLC SERPL-MCNC: 64 MG/DL
HGB BLD-MCNC: 15.5 G/DL (ref 13.3–17.7)
IMM GRANULOCYTES # BLD: 0 10E3/UL
IMM GRANULOCYTES NFR BLD: 0 %
LDLC SERPL CALC-MCNC: 139 MG/DL
LYMPHOCYTES # BLD AUTO: 1.1 10E3/UL (ref 0.8–5.3)
LYMPHOCYTES NFR BLD AUTO: 26 %
MCH RBC QN AUTO: 33 PG (ref 26.5–33)
MCHC RBC AUTO-ENTMCNC: 33.5 G/DL (ref 31.5–36.5)
MCV RBC AUTO: 98 FL (ref 78–100)
MONOCYTES # BLD AUTO: 0.4 10E3/UL (ref 0–1.3)
MONOCYTES NFR BLD AUTO: 10 %
NEUTROPHILS # BLD AUTO: 2.6 10E3/UL (ref 1.6–8.3)
NEUTROPHILS NFR BLD AUTO: 59 %
NONHDLC SERPL-MCNC: 148 MG/DL
NRBC # BLD AUTO: 0 10E3/UL
NRBC BLD AUTO-RTO: 0 /100
PLATELET # BLD AUTO: 194 10E3/UL (ref 150–450)
POTASSIUM SERPL-SCNC: 4.1 MMOL/L (ref 3.4–5.3)
PROT SERPL-MCNC: 7.9 G/DL (ref 6.4–8.3)
PSA SERPL DL<=0.01 NG/ML-MCNC: 1.88 NG/ML (ref 0–4.5)
RBC # BLD AUTO: 4.7 10E6/UL (ref 4.4–5.9)
SODIUM SERPL-SCNC: 139 MMOL/L (ref 135–145)
TRIGL SERPL-MCNC: 47 MG/DL
WBC # BLD AUTO: 4.4 10E3/UL (ref 4–11)

## 2025-03-04 PROCEDURE — 99214 OFFICE O/P EST MOD 30 MIN: CPT | Mod: 25 | Performed by: FAMILY MEDICINE

## 2025-03-04 PROCEDURE — 1126F AMNT PAIN NOTED NONE PRSNT: CPT | Performed by: FAMILY MEDICINE

## 2025-03-04 PROCEDURE — G0463 HOSPITAL OUTPT CLINIC VISIT: HCPCS | Mod: 25

## 2025-03-04 PROCEDURE — 80061 LIPID PANEL: CPT | Mod: ZL | Performed by: FAMILY MEDICINE

## 2025-03-04 PROCEDURE — G0439 PPPS, SUBSEQ VISIT: HCPCS | Performed by: FAMILY MEDICINE

## 2025-03-04 PROCEDURE — 36415 COLL VENOUS BLD VENIPUNCTURE: CPT | Mod: ZL | Performed by: FAMILY MEDICINE

## 2025-03-04 PROCEDURE — 3077F SYST BP >= 140 MM HG: CPT | Performed by: FAMILY MEDICINE

## 2025-03-04 PROCEDURE — 82310 ASSAY OF CALCIUM: CPT | Mod: ZL | Performed by: FAMILY MEDICINE

## 2025-03-04 PROCEDURE — G0103 PSA SCREENING: HCPCS | Mod: ZL | Performed by: FAMILY MEDICINE

## 2025-03-04 PROCEDURE — 82040 ASSAY OF SERUM ALBUMIN: CPT | Mod: ZL | Performed by: FAMILY MEDICINE

## 2025-03-04 PROCEDURE — 82247 BILIRUBIN TOTAL: CPT | Mod: ZL | Performed by: FAMILY MEDICINE

## 2025-03-04 PROCEDURE — 3080F DIAST BP >= 90 MM HG: CPT | Performed by: FAMILY MEDICINE

## 2025-03-04 PROCEDURE — 86803 HEPATITIS C AB TEST: CPT | Mod: ZL | Performed by: FAMILY MEDICINE

## 2025-03-04 PROCEDURE — 85025 COMPLETE CBC W/AUTO DIFF WBC: CPT | Mod: ZL | Performed by: FAMILY MEDICINE

## 2025-03-04 RX ORDER — TRIAMCINOLONE ACETONIDE 1 MG/G
CREAM TOPICAL
Qty: 30 G | Refills: 3 | Status: SHIPPED | OUTPATIENT
Start: 2025-03-04

## 2025-03-04 RX ORDER — SILDENAFIL 100 MG/1
100 TABLET, FILM COATED ORAL DAILY PRN
Qty: 30 TABLET | Refills: 2 | Status: SHIPPED | OUTPATIENT
Start: 2025-03-04

## 2025-03-04 RX ORDER — LORAZEPAM 1 MG/1
1 TABLET ORAL EVERY 8 HOURS PRN
Qty: 20 TABLET | Refills: 0 | Status: SHIPPED | OUTPATIENT
Start: 2025-03-04

## 2025-03-04 RX ORDER — LORAZEPAM 1 MG/1
1 TABLET ORAL EVERY 8 HOURS PRN
Qty: 20 TABLET | Refills: 0 | Status: CANCELLED | OUTPATIENT
Start: 2025-03-04

## 2025-03-04 RX ORDER — SILDENAFIL 100 MG/1
100 TABLET, FILM COATED ORAL DAILY PRN
Qty: 6 TABLET | Refills: 11 | Status: CANCELLED | OUTPATIENT
Start: 2025-03-04

## 2025-03-04 ASSESSMENT — PAIN SCALES - GENERAL: PAINLEVEL_OUTOF10: NO PAIN (0)

## 2025-03-04 NOTE — PROGRESS NOTES
Preventive Care Visit  [facility]  PETE YANEZ DO, Family Medicine  Mar 4, 2025  {Provider  Link to SmartSet :394404}    {PROVIDER CHARTING PREFERENCE:853290}    Trina Schulz is a 69 year old, presenting for the following:  Physical        3/4/2025    12:50 PM   Additional Questions   Roomed by Martha Waite   Accompanied by self         3/4/2025    12:50 PM   Patient Reported Additional Medications   Patient reports taking the following new medications none       HPI      Home /80 range  Rides stationary bike 5 times per week    Occasionally gets a slight rash on his right leg, sometimes left, triamcinolone works well, usually happens in the winter.    ER - Viagra 100mg works well - maybe once a week or so    Lorazepam for situational anxiety (plane rides, etc).  20 pills lasts a year.          Advance Care Planning  Patient does not have a Health Care Directive: Discussed advance care planning with patient; however, patient declined at this time.      3/3/2025   General Health   How would you rate your overall physical health? Excellent   Feel stress (tense, anxious, or unable to sleep) Only a little   (!) STRESS CONCERN      3/3/2025   Nutrition   Diet: Regular (no restrictions)         3/3/2025   Exercise   Days per week of moderate/strenous exercise 5 days   Average minutes spent exercising at this level 30 min     Bike        3/3/2025   Social Factors   Frequency of gathering with friends or relatives Once a week   Worry food won't last until get money to buy more No   Food not last or not have enough money for food? No   Do you have housing? (Housing is defined as stable permanent housing and does not include staying ouside in a car, in a tent, in an abandoned building, in an overnight shelter, or couch-surfing.) Yes   Are you worried about losing your housing? No   Lack of transportation? No   Unable to get utilities (heat,electricity)? No         3/3/2025   Fall Risk   Fallen 2 or  more times in the past year? No   Trouble with walking or balance? No          3/3/2025   Activities of Daily Living- Home Safety   Needs help with the following daily activites None of the above   Safety concerns in the home None of the above         3/3/2025   Dental   Dentist two times every year? Yes     Dentist q6 months, most recent 2 months ago, will need a crown  Eyes - goes every April        3/3/2025   Hearing Screening   Hearing concerns? None of the above         3/3/2025   Driving Risk Screening   Patient/family members have concerns about driving No         3/3/2025   General Alertness/Fatigue Screening   Have you been more tired than usual lately? No         3/3/2025   Urinary Incontinence Screening   Bothered by leaking urine in past 6 months No      Wakes twice to urinate most nights - no interest in prostate meds at this time      Today's PHQ-2 Score:       3/3/2025    11:46 AM   PHQ-2 ( 1999 Pfizer)   Q1: Little interest or pleasure in doing things 0   Q2: Feeling down, depressed or hopeless 0   PHQ-2 Score 0    Q1: Little interest or pleasure in doing things Not at all   Q2: Feeling down, depressed or hopeless Not at all   PHQ-2 Score 0       Patient-reported           3/3/2025   Substance Use   Alcohol more than 3/day or more than 7/wk No   Do you have a current opioid prescription? No   How severe/bad is pain from 1 to 10? 0/10 (No Pain)   Do you use any other substances recreationally? No     Social History     Tobacco Use    Smoking status: Never    Smokeless tobacco: Never   Vaping Use    Vaping status: Never Used   Substance Use Topics    Alcohol use: Yes     Alcohol/week: 4.0 standard drinks of alcohol     Types: 4 Standard drinks or equivalent per week    Drug use: No           3/3/2025   AAA Screening   Family history of Abdominal Aortic Aneurysm (AAA)? No   Last PSA:   PSA   Date Value Ref Range Status   02/05/2021 1.64 0 - 4 ug/L Final     Comment:     Assay Method:  Chemiluminescence  "using Siemens Vista analyzer     Prostate Specific Antigen Screen   Date Value Ref Range Status   02/14/2024 2.11 0.00 - 4.50 ng/mL Final     PSA Tumor Marker   Date Value Ref Range Status   02/09/2023 1.87 0.00 - 4.50 ng/mL Final   02/07/2022 2.15 0.00 - 4.00 ug/L Final     ASCVD Risk   The 10-year ASCVD risk score (Nilson WATKINS, et al., 2019) is: 22%    Values used to calculate the score:      Age: 69 years      Sex: Male      Is Non- : No      Diabetic: No      Tobacco smoker: No      Systolic Blood Pressure: 162 mmHg      Is BP treated: No      HDL Cholesterol: 57 mg/dL      Total Cholesterol: 182 mg/dL        Reviewed and updated as needed this visit by Provider   Tobacco  Allergies  Meds   Med Hx  Surg Hx  Fam Hx  Soc Hx Sexual   Activity            Current providers sharing in care for this patient include:  Patient Care Team:  Cirilo Landeros MD as Assigned PCP    The following health maintenance items are reviewed in Epic and correct as of today:  Health Maintenance   Topic Date Due    HEPATITIS C SCREENING  Never done    MEDICARE ANNUAL WELLNESS VISIT  06/06/2020    LIPID  02/14/2025    DTAP/TDAP/TD IMMUNIZATION (2 - Td or Tdap) 02/11/2025    COLORECTAL CANCER SCREENING  04/10/2025    COVID-19 Vaccine (8 - 2024-25 season) 04/28/2025    FALL RISK ASSESSMENT  03/04/2026    GLUCOSE  02/14/2027    ADVANCE CARE PLANNING  03/04/2030    RSV VACCINE (1 - 1-dose 75+ series) 06/06/2030    PHQ-2 (once per calendar year)  Completed    INFLUENZA VACCINE  Completed    Pneumococcal Vaccine: 50+ Years  Completed    ZOSTER IMMUNIZATION  Completed    HPV IMMUNIZATION  Aged Out    MENINGITIS IMMUNIZATION  Aged Out       Colonoscopy 4/2015 - repeat 10 yrs      {ROS Picklists (Optional):636837}     Objective    Exam  BP (!) 162/102   Pulse 110   Temp 97.4  F (36.3  C) (Tympanic)   Resp 17   Ht 1.778 m (5' 10\")   Wt 85.6 kg (188 lb 11.2 oz)   SpO2 98%   BMI 27.08 kg/m     Estimated " "body mass index is 27.08 kg/m  as calculated from the following:    Height as of this encounter: 1.778 m (5' 10\").    Weight as of this encounter: 85.6 kg (188 lb 11.2 oz).    Physical Exam  {Exam Choices (Optional):944531}         3/4/2025   Mini Cog   Clock Draw Score 0 Abnormal   3 Item Recall 3 objects recalled   Mini Cog Total Score 3     {A Mini-Cog total score of 0-2 suggests the possibility of dementia, score of 3-5 suggests no dementia:264110}         Signed Electronically by: PETE YANEZ DO  {Email feedback regarding this note to [email or web address]   :268400}  " "encounter: 1.778 m (5' 10\").    Weight as of this encounter: 85.6 kg (188 lb 11.2 oz).    Physical Exam  Constitutional:       General: He is not in acute distress.     Appearance: Normal appearance.   HENT:      Head: Normocephalic and atraumatic.      Right Ear: Tympanic membrane and ear canal normal.      Left Ear: Tympanic membrane and ear canal normal.      Mouth/Throat:      Mouth: Mucous membranes are moist.      Pharynx: Oropharynx is clear.   Eyes:      Extraocular Movements: Extraocular movements intact.      Conjunctiva/sclera: Conjunctivae normal.      Pupils: Pupils are equal, round, and reactive to light.   Neck:      Vascular: No carotid bruit.   Cardiovascular:      Rate and Rhythm: Normal rate and regular rhythm.      Heart sounds: Normal heart sounds. No murmur heard.  Pulmonary:      Effort: Pulmonary effort is normal.      Breath sounds: Normal breath sounds. No wheezing, rhonchi or rales.   Abdominal:      General: Bowel sounds are normal.      Palpations: Abdomen is soft.      Tenderness: There is no abdominal tenderness. There is no guarding.      Hernia: No hernia is present.   Musculoskeletal:         General: Normal range of motion.      Cervical back: Normal range of motion.      Right lower leg: No edema.      Left lower leg: No edema.   Lymphadenopathy:      Cervical: No cervical adenopathy.   Skin:     General: Skin is warm and dry.   Neurological:      Mental Status: He is alert and oriented to person, place, and time.      Cranial Nerves: No cranial nerve deficit.      Deep Tendon Reflexes: Reflexes normal.   Psychiatric:         Mood and Affect: Mood normal.         Behavior: Behavior normal.         Thought Content: Thought content normal.         Judgment: Judgment normal.                3/4/2025   Mini Cog   Clock Draw Score 0 Abnormal   3 Item Recall 3 objects recalled   Mini Cog Total Score 3              Signed Electronically by: PETE YANEZ DO    "

## 2025-03-04 NOTE — TELEPHONE ENCOUNTER
Received a PA request for LORazepam (ATIVAN) 1 MG tablet. Submitted electronically, waiting for a response.

## 2025-03-04 NOTE — TELEPHONE ENCOUNTER
Patient notified provider will decide what labs to do at his physical today. Pended labs removed.  Tiki Mccauley LPN

## 2025-03-05 ENCOUNTER — TELEPHONE (OUTPATIENT)
Dept: FAMILY MEDICINE | Facility: OTHER | Age: 70
End: 2025-03-05

## 2025-03-05 LAB — HCV AB SERPL QL IA: NONREACTIVE

## 2025-03-09 ENCOUNTER — HEALTH MAINTENANCE LETTER (OUTPATIENT)
Age: 70
End: 2025-03-09

## 2025-03-10 ENCOUNTER — TELEPHONE (OUTPATIENT)
Dept: FAMILY MEDICINE | Facility: OTHER | Age: 70
End: 2025-03-10

## 2025-03-13 ENCOUNTER — LAB (OUTPATIENT)
Dept: LAB | Facility: OTHER | Age: 70
End: 2025-03-13
Payer: MEDICARE

## 2025-03-13 DIAGNOSIS — Z13.1 SCREENING FOR DIABETES MELLITUS: ICD-10-CM

## 2025-03-13 DIAGNOSIS — E78.00 PURE HYPERCHOLESTEROLEMIA: Primary | ICD-10-CM

## 2025-03-13 DIAGNOSIS — N52.01 ERECTILE DYSFUNCTION DUE TO ARTERIAL INSUFFICIENCY: ICD-10-CM

## 2025-03-13 LAB
EST. AVERAGE GLUCOSE BLD GHB EST-MCNC: 111 MG/DL
HBA1C MFR BLD: 5.5 %

## 2025-03-13 PROCEDURE — 83036 HEMOGLOBIN GLYCOSYLATED A1C: CPT | Mod: ZL

## 2025-03-13 PROCEDURE — 36415 COLL VENOUS BLD VENIPUNCTURE: CPT | Mod: ZL

## 2025-03-13 NOTE — TELEPHONE ENCOUNTER
8:10 AM    Reason for Call: Phone Call    Description: Please call patient re: his MyChart message re: Atorvasatin 20MG. He stated that he would be interested in taking this medication for high cholesterol. He wants this to be filled at [facility]. Please let patient know when this medicine has been ordered.    Was an appointment offered for this call? No  If yes : Appointment type              Date    Preferred method for responding to this message: Telephone Call  What is your phone number ?  [phone]     If we cannot reach you directly, may we leave a detailed response at the number you provided? Yes    Can this message wait until your PCP/provider returns, if available today? Not applicable    Nai Carrillo

## 2025-03-14 RX ORDER — ATORVASTATIN CALCIUM 20 MG/1
20 TABLET, FILM COATED ORAL DAILY
Qty: 90 TABLET | Refills: 1 | Status: SHIPPED | OUTPATIENT
Start: 2025-03-14

## 2025-03-14 NOTE — PROGRESS NOTES
Atorvastatin pended per Martha PURI.  Pharmacy here looking for medication.  Please review and sign if appropriate.  Thank you  ANI Hernandez CC

## 2025-03-17 NOTE — TELEPHONE ENCOUNTER
Attempt # 1  Outcome: Talked with Patient    Comment: spoke with pt, stated that provider would like to see pt back in 4 months (on or around 7/14)

## 2025-07-29 ENCOUNTER — OFFICE VISIT (OUTPATIENT)
Dept: FAMILY MEDICINE | Facility: OTHER | Age: 70
End: 2025-07-29
Attending: FAMILY MEDICINE
Payer: MEDICARE

## 2025-07-29 VITALS
SYSTOLIC BLOOD PRESSURE: 162 MMHG | BODY MASS INDEX: 26.87 KG/M2 | HEART RATE: 105 BPM | OXYGEN SATURATION: 98 % | WEIGHT: 187.3 LBS | DIASTOLIC BLOOD PRESSURE: 92 MMHG | TEMPERATURE: 97.9 F

## 2025-07-29 DIAGNOSIS — R03.0 ELEVATED BLOOD PRESSURE READING IN OFFICE WITH WHITE COAT SYNDROME, WITHOUT DIAGNOSIS OF HYPERTENSION: Primary | ICD-10-CM

## 2025-07-29 DIAGNOSIS — E78.00 PURE HYPERCHOLESTEROLEMIA: ICD-10-CM

## 2025-07-29 LAB
ALBUMIN SERPL BCG-MCNC: 4.6 G/DL (ref 3.5–5.2)
ALP SERPL-CCNC: 71 U/L (ref 40–150)
ALT SERPL W P-5'-P-CCNC: 28 U/L (ref 0–70)
ANION GAP SERPL CALCULATED.3IONS-SCNC: 13 MMOL/L (ref 7–15)
AST SERPL W P-5'-P-CCNC: 30 U/L (ref 0–45)
BILIRUB SERPL-MCNC: 1 MG/DL
BUN SERPL-MCNC: 16.6 MG/DL (ref 8–23)
CALCIUM SERPL-MCNC: 9.7 MG/DL (ref 8.8–10.4)
CHLORIDE SERPL-SCNC: 102 MMOL/L (ref 98–107)
CHOLEST SERPL-MCNC: 142 MG/DL
CREAT SERPL-MCNC: 0.86 MG/DL (ref 0.67–1.17)
EGFRCR SERPLBLD CKD-EPI 2021: >90 ML/MIN/1.73M2
FASTING STATUS PATIENT QL REPORTED: YES
FASTING STATUS PATIENT QL REPORTED: YES
GLUCOSE SERPL-MCNC: 99 MG/DL (ref 70–99)
HCO3 SERPL-SCNC: 22 MMOL/L (ref 22–29)
HDLC SERPL-MCNC: 63 MG/DL
LDLC SERPL CALC-MCNC: 70 MG/DL
NONHDLC SERPL-MCNC: 79 MG/DL
POTASSIUM SERPL-SCNC: 4.2 MMOL/L (ref 3.4–5.3)
PROT SERPL-MCNC: 7.6 G/DL (ref 6.4–8.3)
SODIUM SERPL-SCNC: 137 MMOL/L (ref 135–145)
TRIGL SERPL-MCNC: 45 MG/DL

## 2025-07-29 PROCEDURE — 83718 ASSAY OF LIPOPROTEIN: CPT | Mod: ZL | Performed by: FAMILY MEDICINE

## 2025-07-29 PROCEDURE — 36415 COLL VENOUS BLD VENIPUNCTURE: CPT | Mod: ZL | Performed by: FAMILY MEDICINE

## 2025-07-29 PROCEDURE — G0463 HOSPITAL OUTPT CLINIC VISIT: HCPCS

## 2025-07-29 PROCEDURE — 82565 ASSAY OF CREATININE: CPT | Mod: ZL | Performed by: FAMILY MEDICINE

## 2025-07-29 RX ORDER — ATORVASTATIN CALCIUM 20 MG/1
20 TABLET, FILM COATED ORAL DAILY
Qty: 90 TABLET | Refills: 3 | Status: SHIPPED | OUTPATIENT
Start: 2025-07-29

## 2025-07-29 ASSESSMENT — PAIN SCALES - GENERAL: PAINLEVEL_OUTOF10: NO PAIN (0)

## 2025-07-29 NOTE — PROGRESS NOTES
Answers submitted by the patient for this visit:  General Questionnaire (Submitted on 7/24/2025)  Chief Complaint: Chronic problems general questions HPI Form  What is the reason for your visit today? : Check cholesterol  How many days per week do you miss taking your medication?: 7  Questionnaire about: Chronic problems general questions HPI Form (Submitted on 7/24/2025)  Chief Complaint: Chronic problems general questions HPI Form      Assessment & Plan     Elevated blood pressure reading in office with white coat syndrome, without diagnosis of hypertension  Controlled with home monitoring.  We discussed long-term consequences of high blood pressure and he states he is only high in doctor office.    hypercholesterolemia  Check labs to ensure treated appropriately.  Refills sent in - will adjust dosing if needed based on labs.  - Lipid Profile  - Comprehensive metabolic panel  - atorvastatin (LIPITOR) 20 MG tablet; Take 1 tablet (20 mg) by mouth daily.        Trina Schulz is a 70 year old, presenting for the following health issues:  Recheck Medication        7/29/2025     8:45 AM   Additional Questions   Roomed by Grisel     History of Present Illness       Reason for visit:  Check cholesterol He is missing 7 dose(s) of medications per week.        BP 120s/70s at home.  Every day.  He feels high in office as hates being here and nervous.  States has also checked on other BP machines at pharmacy to ensure his home readings are accurate.    Follow-up after starting atorvastatin 20mg.  Taking statin, no side effects            Medication Followup of Atorvastatin   Taking Medication as prescribed: yes  Side Effects:  None  Medication Helping Symptoms:  yes            Objective    BP (!) 162/92 (BP Location: Right arm, Patient Position: Sitting, Cuff Size: Adult Regular)   Pulse 105   Temp 97.9  F (36.6  C) (Tympanic)   Wt 85 kg (187 lb 4.8 oz)   SpO2 98%   BMI 26.87 kg/m    Body mass index is 26.87  kg/m .  Physical Exam  Constitutional:       General: He is not in acute distress.     Appearance: Normal appearance.      Comments: Visibly getting progressively more nervous as visit progresses.  HR also goes up with this.   Cardiovascular:      Comments: Tachy, regular, no murmur.  Pulmonary:      Effort: Pulmonary effort is normal.      Breath sounds: Normal breath sounds.   Musculoskeletal:      Right lower leg: No edema.      Left lower leg: No edema.   Neurological:      Mental Status: He is alert and oriented to person, place, and time.                    Signed Electronically by: Bijan Singer DO

## 2025-08-08 ENCOUNTER — RESULTS FOLLOW-UP (OUTPATIENT)
Dept: FAMILY MEDICINE | Facility: OTHER | Age: 70
End: 2025-08-08